# Patient Record
Sex: FEMALE | Race: WHITE | NOT HISPANIC OR LATINO | Employment: PART TIME | URBAN - METROPOLITAN AREA
[De-identification: names, ages, dates, MRNs, and addresses within clinical notes are randomized per-mention and may not be internally consistent; named-entity substitution may affect disease eponyms.]

---

## 2019-10-15 LAB
BUPRENORPHINE GLUCURONIDE URINE: <5 NG/ML
BUPRENORPHINE URINE: <2 NG/ML
N-DESMETHYLTAPENTADOL, URINE: <25 NG/ML
NALOXONE URINE: <100 NG/ML
NORBUPRENORPHINE GLUCURONIDE URINE: <5 NG/ML
NORBUPRENORPHINE, URINE: <2 NG/ML
O-DESMETHYLTRAMADOL,UR: <25 NG/ML
TAPENTADOL, URINE: <25 NG/ML
TRAMADOL, UR GC/MS CONF: <25 NG/ML

## 2023-02-20 LAB
CREATININE (MG/DL) IN SER/PLAS: 0.7 MG/DL (ref 0.5–1.05)
GFR FEMALE: >90 ML/MIN/1.73M2
UREA NITROGEN (MG/DL) IN SER/PLAS: 20 MG/DL (ref 6–23)

## 2023-03-16 LAB — ALCOHOL (MG/DL) IN URINE: <10 MG/DL

## 2023-03-20 LAB
6-ACETYLMORPHINE: <25 NG/ML
7-AMINOCLONAZEPAM: <25 NG/ML
ALPHA-HYDROXYALPRAZOLAM: <25 NG/ML
ALPHA-HYDROXYMIDAZOLAM: <25 NG/ML
ALPRAZOLAM: <25 NG/ML
AMPHETAMINE (PRESENCE) IN URINE BY SCREEN METHOD: ABNORMAL
BARBITURATES PRESENCE IN URINE BY SCREEN METHOD: ABNORMAL
BUPRENORPHINE ,URINE: <2 NG/ML
BUPRENORPHINE GLUC, URINE: <5 NG/ML
CANNABINOIDS IN URINE BY SCREEN METHOD: ABNORMAL
CHLORDIAZEPOXIDE: <25 NG/ML
CLONAZEPAM: <25 NG/ML
COCAINE (PRESENCE) IN URINE BY SCREEN METHOD: ABNORMAL
CODEINE: <50 NG/ML
CREATINE, URINE FOR DRUG: 247.7 MG/DL
DIAZEPAM: <25 NG/ML
DRUG SCREEN COMMENT URINE: ABNORMAL
EDDP: <25 NG/ML
ETHYL GLUCURONIDE SCREEN: NEGATIVE NG/ML
FENTANYL CONFIRMATION, URINE: <2.5 NG/ML
HYDROCODONE: 1047 NG/ML
HYDROMORPHONE: 479 NG/ML
LORAZEPAM: <25 NG/ML
METHADONE CONFIRMATION,URINE: <25 NG/ML
MIDAZOLAM: <25 NG/ML
MORPHINE URINE: <50 NG/ML
N-DESMETHYLTAPENTADOL: <25 NG/ML
NALOXONE, URINE: <100 NG/ML
NORBUPRENORPHINE GLUC,URINE: <5 NG/ML
NORBUPRENORPHINE, URINE: <2 NG/ML
NORDIAZEPAM: 197 NG/ML
NORFENTANYL: <2.5 NG/ML
NORHYDROCODONE: >1000 NG/ML
NOROXYCODONE: <25 NG/ML
O-DESMETHYLTRAMADOL: <50 NG/ML
OXAZEPAM: 600 NG/ML
OXYCODONE: <25 NG/ML
OXYMORPHONE: <25 NG/ML
PHENCYCLIDINE (PRESENCE) IN URINE BY SCREEN METHOD: ABNORMAL
TAPENTADOL: <25 NG/ML
TEMAZEPAM: 495 NG/ML
TRAMADOL: <50 NG/ML
ZOLPIDEM METABOLITE (ZCA): <25 NG/ML
ZOLPIDEM: <25 NG/ML

## 2023-09-14 LAB — ALCOHOL (MG/DL) IN URINE: 11 MG/DL

## 2023-09-18 LAB
6-ACETYLMORPHINE: <25 NG/ML
7-AMINOCLONAZEPAM: <25 NG/ML
ALPHA-HYDROXYALPRAZOLAM: <25 NG/ML
ALPHA-HYDROXYMIDAZOLAM: <25 NG/ML
ALPRAZOLAM: <25 NG/ML
AMPHETAMINE (PRESENCE) IN URINE BY SCREEN METHOD: ABNORMAL
BARBITURATES PRESENCE IN URINE BY SCREEN METHOD: ABNORMAL
BUPRENORPHINE ,URINE: <2 NG/ML
BUPRENORPHINE GLUC, URINE: <5 NG/ML
CANNABINOIDS IN URINE BY SCREEN METHOD: ABNORMAL
CHLORDIAZEPOXIDE: <25 NG/ML
CLONAZEPAM: <25 NG/ML
COCAINE (PRESENCE) IN URINE BY SCREEN METHOD: ABNORMAL
CODEINE: <50 NG/ML
CREATINE, URINE FOR DRUG: 155.8 MG/DL
DIAZEPAM: <25 NG/ML
DRUG SCREEN COMMENT URINE: ABNORMAL
EDDP: <25 NG/ML
FENTANYL CONFIRMATION, URINE: <2.5 NG/ML
HYDROCODONE: 511 NG/ML
HYDROMORPHONE: 231 NG/ML
LORAZEPAM: <25 NG/ML
METHADONE CONFIRMATION,URINE: <25 NG/ML
MIDAZOLAM: <25 NG/ML
MORPHINE URINE: <50 NG/ML
N-DESMETHYLTAPENTADOL: <25 NG/ML
NALOXONE, URINE: <100 NG/ML
NORBUPRENORPHINE GLUC,URINE: <5 NG/ML
NORBUPRENORPHINE, URINE: <2 NG/ML
NORDIAZEPAM: 32 NG/ML
NORFENTANYL: <2.5 NG/ML
NORHYDROCODONE: >1000 NG/ML
NOROXYCODONE: <25 NG/ML
O-DESMETHYLTRAMADOL: <50 NG/ML
OXAZEPAM: 106 NG/ML
OXYCODONE: <25 NG/ML
OXYMORPHONE: <25 NG/ML
PHENCYCLIDINE (PRESENCE) IN URINE BY SCREEN METHOD: ABNORMAL
TAPENTADOL: <25 NG/ML
TEMAZEPAM: 86 NG/ML
TRAMADOL: <50 NG/ML
ZOLPIDEM METABOLITE (ZCA): <25 NG/ML
ZOLPIDEM: <25 NG/ML

## 2023-10-04 NOTE — H&P (VIEW-ONLY)
Diagnoses/Problems     · Long term prescription opiate use (V58.69) (Z79.891)   · Cervical radiculopathy, chronic (723.4) (M54.12)   · Chronic lumbar radiculopathy (724.4) (M54.16)    Orders     · DULoxetine HCl - 60 MG Oral Capsule Delayed Release Particles; TAKE 1  CAPSULE ONCE DAILY     · DULoxetine HCl - 30 MG Oral Capsule Delayed Release Particles; TAKE 1  CAPSULE Daily     · Alcohol, Urine; Status:Active - Retrospective Authorization; Requested for:[id number];    · Buprenorphine Confirm, U; Status:Active - Retrospective Authorization; Requested  for:[id number];    · OPIATE/OPIOID/BENZO [EXTENDED] PRESCRIPTION COMPLIANCE; Status:Active -  Retrospective Authorization; Requested for:[id number];    · TAPENTADOL CONFIRM.,URINE; Status:Active - Retrospective Authorization;  Requested for:[id number];      · From  HYDROcodone-Acetaminophen 5-325 MG Oral Tablet TAKE 1 TABLET  3 times daily PRN pain To HYDROcodone-Acetaminophen 5-325 MG Oral Tablet (Norco)  Take 1 tablet twice daily    Provider Impressions    Assessment:  Malu Degroot is a 67-year-old female who presents as a new patient in our clinic today. Patient has a history of chronic lumbar radiculopathy status post multiple epidural steroid injections with significant relief. She presents with bilateral neck pain and upper extremity radiculopathy. We discussed with the patient that the nature of her disease is most likely chronic wear-and-tear of the cervical spine resulting in narrowing of the spinal nerve roots bilaterally. I did review patient's MRI of reports showing some degeneration especially at C4-5 with foraminal stenosis likely causing the upper extremity neuritis. We discussed that the mainstay of treatment for this is a combination of medication optimization, physical therapy, as well as injections. We also discussed interaction concerning benzodiazepines and narcotics. Patient was educated on the importance of weaning off of 1 of these medications  "while being on the other. She stated she would like to continue taking her benzodiazepine, and was amenable to weaning down and off of her Norco. We discussed introducing Cymbalta in place of the Norco, including the side effects of medication, and she wishes to proceed with this change. We also discussed a cervical epidural steroid injection under fluoroscopy including the risks and benefits of the procedure, and she wishes to proceed.    Plan:  1. Patient will wean down Norco 5 mg to 2 times a day for the next month, and then 1 time per day in a subsequent month, and then eventually off. [place] reviewed  2. Patient will be started on Cymbalta 30-60 mg nightly.  3. Patient will continue physical therapy exercises at home as prescribed. She was educated on the importance of maintaining these exercises even if her symptoms improve.  4. Patient will be scheduled for a cervical epidural steroid injection under fluoroscopy. Of note, the patient does have diabetes, and we indicated that we will use half a dose of steroids for the injection. Discussed risk benefits and alternatives and patient would like to proceed. She has failed conservative treatment to include physical therapy finished last month as well as multiple medications to include NSAIDs    CPT 56743  â€“ Continue gabapentin 600 at night.  â€“ Patient will follow-up with our nurse practitioner in 1 month after the injection.      Chief Complaint     Location of Pain: Patient returns to the office for interval re-evaluation of \" lower back pain that radiates into bilateral hips/buttocks down the both legs to the toes. Pain in right shin and right thigh. I have burning/numbness in bilateral feet. The pain also radiates up into the shoulder blades. Neck pain as well. \" The pain is \"constant\" but varies in severity and continues to be worsened with activities of daily living while relieved by prescription medication.   No new c/o pain today, wants to discuss neck " "injections.    August MRI neck and shoulder    Pain Score: \"6\" /10 0-10 score    Treatment: [place] 5/325 mg TID- [facility]  Medication Count: 23 pills left in rx bottle   Fill Date: 8/18/23  Last Doses Taken: 9/12/23 3 doses and takes BID-TID  Efficacy: \" 20% relief for 3-4 hours \"  Side Effects: denies    Narcan: Pt brought unopened Narcan to today's visit. EXP: 5/2024    Other treatment plans-medications/neuromodulators: Gabapentin, taking 600 daily currently-needs refill?- [address] [address] [facility]/ Ibuprofen 800mg -needs refill 1 tab TID/ compound cream [facility]- uses daily    Injections and/or Procedures:4/25/22 bilateral bursa and 4/18/22 LESI    Plan of Care: Pt sts, \"I'm satisfied with my current plan of care.\"   Pregnant: n/a    Other: denies    SWAB genetic test:     Urine Screen: 9/13/23  Office agreement: 6/12/23  Narcotics Agreement: 6/12/23  ORT: 9/13/23 score 4  PDUQ: 6/12/23 score 4      History of Present Illness    Controlled Substance:   OPIOID   Opioid Risk Screening:   Opioid Risk Tool   Last opioid risk screening date/ordered today: 9/13/23  Family history of substance abuse: Illegal Drugs = 3  Patient's total score is 3, within range of Low Risk (<= 3).       Malu Degroot is a pleasant 67-year-old female new patient in our clinic today. Patient has a history of lumbar spinal stenosis with bilateral lower extremity competency as well as cervical radiculopathy in the upper extremities. Injections in her lumbar spine that has given her significant relief in the past. Today her biggest complaint is pain in the neck associated with pain in the shoulders and arms on both sides. Pain is associated with numbness and tingling mostly on the right side in the fingers. She does endorse dropping items and weakness on the right side more than left. She denies any temperature changes, nausea or vomiting, or vision changes. Patient currently takes gabapentin 600 mg at nighttime, as well as Norco 5 milligrams " 2â€“3 times a day, diazepam 5 mg daily. She recently finished a course of 10 sessions of physical therapy in August and continues to do exercises at home.     I have personally reviewed the OARRS report for [facility]. I have considered the risks of abuse, dependence, addiction and diversion.   Is the patient prescribed a combination of a benzodiazepine and opioid? Yes.   Weaning off Norco   Last urine drug screening date/ordered today: 9/13/2023   Results of last screen: Results as expected.   Controlled Substance Agreement:   I have printed this form and reviewed each line item with the patient and the patient has verbalized understanding.   Date of the last Controlled Substance Agreement: 6/12/2023      Review of Systems    13 systems all normal except noted in HP.      Active Problems     · Abnormal posture (781.92) (R29.3)   · Acute midline thoracic back pain (724.1) (M54.6)   · Acute nonintractable headache, unspecified headache type (784.0) (R51.9)   · Asymptomatic menopause (V49.81) (Z78.0)   · Bilateral hip pain (719.45) (M25.551,M25.552)   · Body mass index (BMI) of 34.0 to 34.9 in adult (V85.34) (Z68.34)   · Bulge of lumbar disc without myelopathy (722.52) (M51.36)   · Cervical radiculopathy, chronic (723.4) (M54.12)   · Chronic diarrhea (787.91) (K52.9)   · Chronic lumbar radiculopathy (724.4) (M54.16)   · Chronic neck and back pain (723.1,724.5,338.29) (M54.2,M54.9,G89.29)   · Class 2 severe obesity with serious comorbidity and body mass index (BMI) of 35.0 to  35.9 in adult (278.01,V85.35) (E66.01,Z68.35)   · Colon polyps (211.3) (K63.5)   · Daily nausea (787.02) (R11.0)   · Depression, major, in remission (296.25) (F32.5)   · Disc displacement, lumbar (722.10) (M51.26)   · Dysphagia (787.20) (R13.10)   · Encounter for immunization (V03.89) (Z23)   · Episodic benzodiazepine dependence (304.12) (F13.20)   · Frequent UTI (599.0) (N39.0)   · GERD (gastroesophageal reflux disease) (530.81) (K21.9)   ·  Greater trochanteric bursitis of both hips (726.5) (M70.61,M70.62)   · History of Meniere's disease (V12.49) (Z86.69)   · Inguinal pain (789.09) (R10.30)   · Irritable bowel syndrome, unspecified type (564.1) (K58.9)   · Kidney stones, calcium oxalate (592.0) (N20.0)   · Lipid screening (V77.91) (Z13.220)   · Lipoma of torso (214.1) (D17.1)   · Localized primary osteoarthritis of carpometacarpal joint of right thumb (715.14)  (M18.11)   · Long term prescription opiate use (V58.69) (Z79.891)   · Lung nodule (793.11) (R91.1)   · Migraines (346.90) (G43.909)   · Myofascial pain syndrome (729.1) (M79.18)   · Neck pain (723.1) (M54.2)   · Neuropathic pain (729.2) (M79.2)   · Obstructive sleep apnea syndrome in adult (327.23) (G47.33)   · Overactive bladder (596.51) (N32.81)   · Pain management (780.96) (R52)   · Pain of both sacroiliac joints (724.6) (M53.3)   · Preventative health care (V70.0) (Z00.00)   · Primary osteoarthritis of right knee (715.16) (M17.11)   · Radiculopathy (729.2) (M54.10)   · Right foot pain (729.5) (M79.671)   · Right knee pain (719.46) (M25.561)   · Right wrist pain (719.43) (M25.531)   · Sacroiliitis (720.2) (M46.1)   · Screen for colon cancer (V76.51) (Z12.11)   · Screening mammogram, encounter for (V76.12) (Z12.31)   · Sebaceous cyst (706.2) (L72.3)   · Shortness of breath on exertion (786.05) (R06.02)   · Skin cancer screening (V76.43) (Z12.83)   · Type 2 diabetes mellitus with hyperglycemia, without long-term current use of insulin  (250.00,790.29) (E11.65)   · Vertigo (780.4) (R42)    Past Medical History     · History of Adrenal mass (255.8) (E27.8)   · History of Bilateral lumbar radiculopathy (724.4) (M54.16)   · Resolved Date: 16 Nov 2020   · History of BiPAP (biphasic positive airway pressure) dependence (V46.8) (Z99.89)   · History of Calcium kidney stone (592.0) (N20.0)   · Resolved Date: 16 Nov 2020   · History of CPAP (continuous positive airway pressure) dependence (V46.8)  (Z99.89)   · History of Generalized abdominal pain (789.07) (R10.84)   · H/O renal calculi (V13.01) (Z87.442)   · History of arthritis (V13.4) (Z87.39)   · History of chronic back pain (V13.59) (Z87.39)   · History of degenerative disc disease (V13.59) (Z87.39)   · History of depression (V11.8) (Z86.59)   · History of dizziness (V13.89) (Z87.898)   · Resolved Date: 20 Feb 2020   · History of ear pain (V12.49) (Z86.69)   · History of endometriosis (V13.29) (Z87.42)   · History of esophageal reflux (V12.79) (Z87.19)   · History of fibromyalgia (V13.59) (Z87.39)   · History of hematuria (V13.09) (Z87.448)   · History of lateral epicondylitis of right elbow (V13.59) (Z87.39)   · Resolved Date: 16 Nov 2020   · History of malignant neoplasm of skin (V10.83) (Z85.828)   · History of Meniere's disease (V12.49) (Z86.69)   · History of migraine (V12.49) (Z86.69)   · History of sleep apnea (V13.89) (Z86.69)   · History of Infection of right foot (686.9) (L08.9)   · Resolved Date: 16 Nov 2020   · Irritable bowel syndrome, unspecified type (564.1) (K58.9)   · History of Lumbar pain (724.2) (M54.50)   · Resolved Date: 20 May 2021   · History of Pain of right thumb (729.5) (M79.644)   · Resolved Date: 12 Nov 2019   · History of SOB (shortness of breath) on exertion (786.05) (R06.02)   · History of Uses inhaler device    Surgical History     · History of Breast biopsy   · RIGHT AND LEFT   · History of Carpal tunnel surgery   · History of Cholecystectomy   · History of Cyst excision   · History of Excision of basal cell carcinoma   · History of Foot surgery   · History of Hysterectomy   · History of Oophorectomy   · LEFT AND RIGHT   · History of Skin biopsy   · History of Tonsillectomy   · History of Wrist surgery    Family History     · Family history of Drug or chemical induced diabetes mellitus with diabetic autonomic  neuropathy, without long-term current use of insulin   · Family history of cerebrovascular accident (CVA)  (V17.1) (Z82.3)   · Family history of coronary artery disease (V17.3) (Z82.49)   · Family history of diabetes mellitus (V18.0) (Z83.3)   · Family history of malignant neoplasm (V16.9) (Z80.9)   · BREAST AND SKIN CANCER   · Family history of malignant neoplasm of breast (V16.3) (Z80.3)     · Family history of Anxiety   · Family history of depression (V17.0) (Z81.8)   · Family history of malignant neoplasm (V16.9) (Z80.9)   · BREAST AND SKIN CANCER   · Family history of malignant neoplasm of skin (V16.8) (Z80.8)     · Family history of malignant neoplasm (V16.9) (Z80.9)   · BREAST AND SKIN CANCER   · Family history of malignant neoplasm of skin (V16.8) (Z80.8)     · Family history of malignant neoplasm (V16.9) (Z80.9)   · BREAST AND SKIN CANCER   · Family history of malignant neoplasm of esophagus (V16.0) (Z80.0)   · Family history of malignant neoplasm of skin (V16.8) (Z80.8)    Social History     · Caffeine use (V49.89) (Z78.9)   · Does not use illicit drugs (V49.89) (Z78.9)   · Drug use (305.90) (F19.90)   · PAST   · Employed   · Former smoker (V15.82) (Z87.891)   · Lives with spouse   ·    · No alcohol use    Allergies     · buprenorphine   Rash; Recorded By: Breonna Hoyt; 9/1/2020 3:36:11 PM   · Sulfa Drugs   Recorded By: Gio Schmidt; 2/5/2019 9:47:49 AM    Current Meds    Medication Name Instruction   Accu-Chek Katie Plus In Vitro Strip TEST ONCE DAILY. PLEASE DISPENSE INSURANCE PREFERRED BRAND   Accu-Chek Katie Plus w/Device Kit USE AS DIRECTED please dispense insurance preferred brand   Accu-Chek Multiclix Lancets MISC USE TO TEST GLUCOSE ONCE DAILY PLEAS DISPENSE INSURANCE PREFERRED BRAND   Albuterol Sulfate  (90 Base) MCG/ACT Inhalation Aerosol Solution INHALE 1 TO 2 PUFFS EVERY 4 TO 6 HOURS AS NEEDED.   Bentyl 20 MG TABS    Black Elderberry(Berry-Flower) 575 MG Oral Capsule    diazePAM 5 MG Oral Tablet TAKE 1 TABLET DAILY AS NEEDED.   Flexeril 10 MG TABS    Gabapentin 300 MG Oral Capsule  TAKE 3 CAPSULE Bedtime   HYDROcodone-Acetaminophen 5-325 MG Oral Tablet TAKE 1 TABLET 3 times daily PRN pain   Ibuprofen 800 MG Oral Tablet TAKE 1 TABLET 3 times daily PRN for pain take with food   Multi-Vitamin TABS    Narcan 4 MG/0.1ML Nasal Liquid Spray 4 mg intranasally once if needed for overdose or respiratory depression   Ondansetron HCl - 4 MG Oral Tablet TAKE 1 TABLET Twice daily PRN   Ozempic (0.25 or 0.5 MG/DOSE) 2 MG/3ML Subcutaneous Solution Pen-injector    Scopolamine 1 MG/3DAYS Transdermal Patch 72 Hour APPLY 1 PATCH EVERY 3 DAYS   Transdermal Pain Base External Cream APPLY AS DIRECTED.     Vitals  Vital Signs    Recorded: [id number] 11:08AM   Heart Rate 80   Respiration 18   Systolic 130   Diastolic 80   Height 5 ft 6 in   Weight 209 lb 3.2 oz   BMI Calculated 33.77 kg/m2   BSA Calculated 2.04   O2 Saturation 96     Physical Exam  PHYSICAL EXAM  Vitals signs reviewed  Constitutional:    General: Not in acute distress   Appearance: Normal appearance. Not ill-appearing.  HENT:   Head: Normocephalic and atraumatic  Eyes:   Conjunctiva/sclera: Conjunctivae normal  Cardiovascular:   Rate and Rhythm: Normal rate and regular rhythm  Pulmonary:   Effort: No respiratory distress  Abdominal:   Palpations: Abdomen is soft  Musculoskeletal: RODRIGUEZ Spurling test positive bilaterally. 4/5 strength in the right upper extremity, 5/5 strength in left upper extremity.  Skin:   General: Skin is warm and dry  Neurological:   General: Sensation to pinprick and light touch over the C6-7 distributions right hand. No focal deficits in the left upper extremity.  Psychiatric:    Mood and Affect: Mood normal    Behavior: Behavior normal       Results/Data  Radiology were obtained and reviewed. Pertinent positive and negative findings were considered in medical decision making. Patient underwent an MRI of cervical spine from outside at work and presented with paper copy of the results. Documentation noted only mild neural foraminal  narrowing without central canal stenosis in the cervical spine.      'Scores and Scales'  Pain Disability Index    [id number] 11:14AM   Family and Home Responsibilities: Activities related to home and family 8   Recreation: Hobbies sports and other leisure time activities 8   Social Activity: Participation with friends and acquaintances other than family members 8   Occupation: Activities partly or directly related to working including housework or volunteering 9   Sexual Behavior: Frequency and quality of sex life 10   Self Care: Personal maintenance and independent daily living (bathing dressing etc.) 8   Life-support Activity: Basic life-supporting behaviors (eating sleeping breathing etc.) 8   PDI Total 59     Attending Note       Trainee role: Fellow           I saw and evaluated the patient. I personally obtained the key and critical portions of the history and physical exam or was physically present for key and critical portions performed by the trainee. I reviewed the trainee's documentation and discussed the patient with the trainee. I agree with the trainee's medical decision making, as documented on the trainee's note.      Signatures   Electronically signed by : Tommy Gonzalez, ; Sep 13 2023 11:57AM EST (Author)    Electronically signed by : Ilan Harvey MD; Sep 13 2023 12:12PM EST (Author)

## 2023-10-10 DIAGNOSIS — M54.12 CERVICAL NEURITIS: Primary | ICD-10-CM

## 2023-10-13 ENCOUNTER — HOSPITAL ENCOUNTER (OUTPATIENT)
Dept: RADIOLOGY | Facility: HOSPITAL | Age: 68
Discharge: HOME | End: 2023-10-13
Payer: MEDICARE

## 2023-10-13 ENCOUNTER — HOSPITAL ENCOUNTER (OUTPATIENT)
Dept: PAIN MEDICINE | Facility: HOSPITAL | Age: 68
Discharge: HOME | End: 2023-10-13
Payer: MEDICARE

## 2023-10-13 VITALS
DIASTOLIC BLOOD PRESSURE: 80 MMHG | RESPIRATION RATE: 18 BRPM | SYSTOLIC BLOOD PRESSURE: 134 MMHG | TEMPERATURE: 97 F | OXYGEN SATURATION: 95 % | HEART RATE: 83 BPM

## 2023-10-13 DIAGNOSIS — M54.12 CERVICAL RADICULOPATHY, CHRONIC: Primary | ICD-10-CM

## 2023-10-13 DIAGNOSIS — R52 PAIN: ICD-10-CM

## 2023-10-13 DIAGNOSIS — M54.12 CERVICAL NEURITIS: ICD-10-CM

## 2023-10-13 PROBLEM — D48.5 NEOPLASM OF UNCERTAIN BEHAVIOR OF SKIN: Status: ACTIVE | Noted: 2021-08-31

## 2023-10-13 PROBLEM — M70.62 GREATER TROCHANTERIC BURSITIS OF BOTH HIPS: Status: ACTIVE | Noted: 2023-10-13

## 2023-10-13 PROBLEM — R06.02 SHORTNESS OF BREATH ON EXERTION: Status: ACTIVE | Noted: 2023-10-13

## 2023-10-13 PROBLEM — F13.20: Status: ACTIVE | Noted: 2023-10-13

## 2023-10-13 PROBLEM — Z86.69 HISTORY OF MENIERE'S DISEASE: Status: ACTIVE | Noted: 2023-10-13

## 2023-10-13 PROBLEM — M46.1 SACROILIITIS (CMS-HCC): Status: ACTIVE | Noted: 2023-10-13

## 2023-10-13 PROBLEM — Z79.891 LONG TERM PRESCRIPTION OPIATE USE: Status: ACTIVE | Noted: 2023-10-13

## 2023-10-13 PROBLEM — M51.26 DISC DISPLACEMENT, LUMBAR: Status: ACTIVE | Noted: 2023-10-13

## 2023-10-13 PROBLEM — L57.0 ACTINIC KERATOSIS: Status: ACTIVE | Noted: 2021-08-31

## 2023-10-13 PROBLEM — L72.3 SEBACEOUS CYST: Status: ACTIVE | Noted: 2023-10-13

## 2023-10-13 PROBLEM — E66.01 CLASS 2 SEVERE OBESITY WITH SERIOUS COMORBIDITY AND BODY MASS INDEX (BMI) OF 35.0 TO 35.9 IN ADULT (MULTI): Status: ACTIVE | Noted: 2023-10-13

## 2023-10-13 PROBLEM — M54.9 CHRONIC NECK AND BACK PAIN: Status: ACTIVE | Noted: 2023-10-13

## 2023-10-13 PROBLEM — R91.1 LUNG NODULE: Status: ACTIVE | Noted: 2023-10-13

## 2023-10-13 PROBLEM — M17.11 PRIMARY OSTEOARTHRITIS OF RIGHT KNEE: Status: ACTIVE | Noted: 2023-10-13

## 2023-10-13 PROBLEM — E11.65 TYPE 2 DIABETES MELLITUS WITH HYPERGLYCEMIA, WITHOUT LONG-TERM CURRENT USE OF INSULIN (MULTI): Status: ACTIVE | Noted: 2023-10-13

## 2023-10-13 PROBLEM — D49.2 NEOPLASM OF UNSPECIFIED BEHAVIOR OF BONE, SOFT TISSUE, AND SKIN: Status: ACTIVE | Noted: 2021-08-31

## 2023-10-13 PROBLEM — M54.2 CHRONIC NECK AND BACK PAIN: Status: ACTIVE | Noted: 2023-10-13

## 2023-10-13 PROBLEM — G89.29 CHRONIC NECK AND BACK PAIN: Status: ACTIVE | Noted: 2023-10-13

## 2023-10-13 PROBLEM — R42 VERTIGO: Status: ACTIVE | Noted: 2023-10-13

## 2023-10-13 PROBLEM — M54.16 CHRONIC LUMBAR RADICULOPATHY: Status: ACTIVE | Noted: 2023-10-13

## 2023-10-13 PROBLEM — K63.5 COLON POLYPS: Status: ACTIVE | Noted: 2023-10-13

## 2023-10-13 PROBLEM — L82.1 OTHER SEBORRHEIC KERATOSIS: Status: ACTIVE | Noted: 2021-08-31

## 2023-10-13 PROBLEM — L81.4 OTHER MELANIN HYPERPIGMENTATION: Status: ACTIVE | Noted: 2021-08-31

## 2023-10-13 PROBLEM — R11.0 DAILY NAUSEA: Status: ACTIVE | Noted: 2023-10-13

## 2023-10-13 PROBLEM — R29.3 ABNORMAL POSTURE: Status: ACTIVE | Noted: 2023-10-13

## 2023-10-13 PROBLEM — M70.61 GREATER TROCHANTERIC BURSITIS OF BOTH HIPS: Status: ACTIVE | Noted: 2023-10-13

## 2023-10-13 PROBLEM — D22.4 MELANOCYTIC NEVI OF SCALP AND NECK: Status: ACTIVE | Noted: 2021-08-31

## 2023-10-13 PROBLEM — M51.36 BULGE OF LUMBAR DISC WITHOUT MYELOPATHY: Status: ACTIVE | Noted: 2023-10-13

## 2023-10-13 PROBLEM — D17.1 LIPOMA OF TORSO: Status: ACTIVE | Noted: 2023-10-13

## 2023-10-13 PROBLEM — D22.5 MELANOCYTIC NEVI OF TRUNK: Status: ACTIVE | Noted: 2021-08-31

## 2023-10-13 PROBLEM — D22.70 MELANOCYTIC NEVI OF UNSPECIFIED LOWER LIMB, INCLUDING HIP: Status: ACTIVE | Noted: 2021-08-31

## 2023-10-13 PROBLEM — M51.369 BULGE OF LUMBAR DISC WITHOUT MYELOPATHY: Status: ACTIVE | Noted: 2023-10-13

## 2023-10-13 PROBLEM — E66.812 CLASS 2 SEVERE OBESITY WITH SERIOUS COMORBIDITY AND BODY MASS INDEX (BMI) OF 35.0 TO 35.9 IN ADULT: Status: ACTIVE | Noted: 2023-10-13

## 2023-10-13 PROBLEM — Z85.828 PERSONAL HISTORY OF OTHER MALIGNANT NEOPLASM OF SKIN: Status: ACTIVE | Noted: 2021-08-31

## 2023-10-13 PROBLEM — L57.9 SKIN CHANGES DUE TO CHRONIC EXPOSURE TO NONIONIZING RADIATION, UNSPECIFIED: Status: ACTIVE | Noted: 2021-08-31

## 2023-10-13 PROBLEM — K58.9 IRRITABLE BOWEL SYNDROME: Status: ACTIVE | Noted: 2023-10-13

## 2023-10-13 PROBLEM — D22.60 MELANOCYTIC NEVI OF UNSPECIFIED UPPER LIMB, INCLUDING SHOULDER: Status: ACTIVE | Noted: 2021-08-31

## 2023-10-13 PROBLEM — R13.10 DYSPHAGIA: Status: ACTIVE | Noted: 2023-10-13

## 2023-10-13 PROBLEM — F32.5 DEPRESSION, MAJOR, IN REMISSION (CMS-HCC): Status: ACTIVE | Noted: 2023-10-13

## 2023-10-13 PROBLEM — N32.81 OVERACTIVE BLADDER: Status: ACTIVE | Noted: 2023-10-13

## 2023-10-13 PROBLEM — K52.9 CHRONIC DIARRHEA: Status: ACTIVE | Noted: 2023-10-13

## 2023-10-13 PROBLEM — M18.11 LOCALIZED PRIMARY OSTEOARTHRITIS OF CARPOMETACARPAL JOINT OF RIGHT THUMB: Status: ACTIVE | Noted: 2023-10-13

## 2023-10-13 PROBLEM — D22.39 MELANOCYTIC NEVI OF OTHER PARTS OF FACE: Status: ACTIVE | Noted: 2021-08-31

## 2023-10-13 PROBLEM — N20.0 KIDNEY STONES, CALCIUM OXALATE: Status: ACTIVE | Noted: 2023-10-13

## 2023-10-13 PROCEDURE — 62321 NJX INTERLAMINAR CRV/THRC: CPT | Performed by: PHYSICAL MEDICINE & REHABILITATION

## 2023-10-13 PROCEDURE — 2550000001 HC RX 255 CONTRASTS: Performed by: PHYSICAL MEDICINE & REHABILITATION

## 2023-10-13 PROCEDURE — 77003 FLUOROGUIDE FOR SPINE INJECT: CPT

## 2023-10-13 PROCEDURE — 2500000005 HC RX 250 GENERAL PHARMACY W/O HCPCS: Performed by: PHYSICAL MEDICINE & REHABILITATION

## 2023-10-13 PROCEDURE — 2500000004 HC RX 250 GENERAL PHARMACY W/ HCPCS (ALT 636 FOR OP/ED): Performed by: PHYSICAL MEDICINE & REHABILITATION

## 2023-10-13 PROCEDURE — 7100000009 HC PHASE TWO TIME - INITIAL BASE CHARGE

## 2023-10-13 PROCEDURE — 7100000010 HC PHASE TWO TIME - EACH INCREMENTAL 1 MINUTE

## 2023-10-13 RX ORDER — CEFUROXIME AXETIL 250 MG/1
6 TABLET ORAL AS NEEDED
COMMUNITY
Start: 2019-11-21 | End: 2023-10-13 | Stop reason: ALTCHOICE

## 2023-10-13 RX ORDER — BLOOD SUGAR DIAGNOSTIC
1 STRIP MISCELLANEOUS DAILY
COMMUNITY
Start: 2022-03-02

## 2023-10-13 RX ORDER — HYDROGEN PEROXIDE 3 %
20 SOLUTION, NON-ORAL MISCELLANEOUS DAILY
COMMUNITY
End: 2023-10-13 | Stop reason: SDUPTHER

## 2023-10-13 RX ORDER — SEMAGLUTIDE 0.68 MG/ML
INJECTION, SOLUTION SUBCUTANEOUS
COMMUNITY

## 2023-10-13 RX ORDER — MULTIVITAMIN
TABLET ORAL
COMMUNITY

## 2023-10-13 RX ORDER — UBIDECARENONE 75 MG
500 CAPSULE ORAL DAILY
COMMUNITY

## 2023-10-13 RX ORDER — CREAM BASE NO.31
1 CREAM (GRAM) MISCELLANEOUS SEE ADMIN INSTRUCTIONS
COMMUNITY

## 2023-10-13 RX ORDER — FLUTICASONE PROPIONATE 50 MCG
2 SPRAY, SUSPENSION (ML) NASAL DAILY
COMMUNITY
Start: 2023-04-28

## 2023-10-13 RX ORDER — DEXAMETHASONE SODIUM PHOSPHATE 4 MG/ML
INJECTION, SOLUTION INTRA-ARTICULAR; INTRALESIONAL; INTRAMUSCULAR; INTRAVENOUS; SOFT TISSUE AS NEEDED
Status: COMPLETED | OUTPATIENT
Start: 2023-10-13 | End: 2023-10-13

## 2023-10-13 RX ORDER — DICYCLOMINE HYDROCHLORIDE 20 MG/1
20 TABLET ORAL
COMMUNITY

## 2023-10-13 RX ORDER — MELOXICAM 7.5 MG/1
1 TABLET ORAL DAILY
COMMUNITY
Start: 2015-10-14 | End: 2023-10-13 | Stop reason: ALTCHOICE

## 2023-10-13 RX ORDER — PANTOPRAZOLE SODIUM 40 MG/1
40 TABLET, DELAYED RELEASE ORAL
COMMUNITY

## 2023-10-13 RX ORDER — SUMATRIPTAN SUCCINATE 100 MG/1
100 TABLET ORAL ONCE AS NEEDED
COMMUNITY
Start: 2015-10-16 | End: 2023-10-13 | Stop reason: ALTCHOICE

## 2023-10-13 RX ORDER — LIDOCAINE HYDROCHLORIDE 5 MG/ML
INJECTION, SOLUTION INFILTRATION; INTRAVENOUS AS NEEDED
Status: COMPLETED | OUTPATIENT
Start: 2023-10-13 | End: 2023-10-13

## 2023-10-13 RX ORDER — SUMATRIPTAN 6 MG/.5ML
1 INJECTION, SOLUTION SUBCUTANEOUS DAILY PRN
COMMUNITY

## 2023-10-13 RX ORDER — LANCETS 33 GAUGE
EACH MISCELLANEOUS
COMMUNITY
Start: 2023-07-28

## 2023-10-13 RX ORDER — ALBUTEROL SULFATE 90 UG/1
1-2 AEROSOL, METERED RESPIRATORY (INHALATION) EVERY 6 HOURS PRN
COMMUNITY
Start: 2019-11-21

## 2023-10-13 RX ORDER — PROPRANOLOL HYDROCHLORIDE 60 MG/1
60 CAPSULE, EXTENDED RELEASE ORAL DAILY
COMMUNITY
Start: 2022-07-20

## 2023-10-13 RX ORDER — GABAPENTIN 300 MG/1
900 CAPSULE ORAL NIGHTLY
COMMUNITY
Start: 2023-07-03 | End: 2023-11-09 | Stop reason: SDUPTHER

## 2023-10-13 RX ORDER — SCOLOPAMINE TRANSDERMAL SYSTEM 1 MG/1
1 PATCH, EXTENDED RELEASE TRANSDERMAL
COMMUNITY
Start: 2019-11-21

## 2023-10-13 RX ORDER — DIAZEPAM 10 MG/1
10 TABLET ORAL EVERY 6 HOURS PRN
COMMUNITY
End: 2023-10-13 | Stop reason: SDUPTHER

## 2023-10-13 RX ORDER — NALOXONE HYDROCHLORIDE 4 MG/.1ML
4 SPRAY NASAL AS NEEDED
COMMUNITY
Start: 2020-05-26

## 2023-10-13 RX ORDER — IBUPROFEN 800 MG/1
800 TABLET ORAL
COMMUNITY
Start: 2023-08-08 | End: 2023-11-09 | Stop reason: SDUPTHER

## 2023-10-13 RX ORDER — ONDANSETRON 4 MG/1
4 TABLET, FILM COATED ORAL 2 TIMES DAILY PRN
COMMUNITY
Start: 2019-11-21

## 2023-10-13 RX ORDER — DIAZEPAM 5 MG/1
1 TABLET ORAL DAILY PRN
COMMUNITY

## 2023-10-13 RX ORDER — ASPIRIN 325 MG
10000 TABLET, DELAYED RELEASE (ENTERIC COATED) ORAL
COMMUNITY

## 2023-10-13 RX ORDER — METFORMIN HYDROCHLORIDE 500 MG/1
1 TABLET, EXTENDED RELEASE ORAL DAILY
COMMUNITY
End: 2023-10-13

## 2023-10-13 RX ORDER — ECHINACEA 400 MG
CAPSULE ORAL
COMMUNITY

## 2023-10-13 RX ADMIN — LIDOCAINE HYDROCHLORIDE 10 ML: 5 INJECTION, SOLUTION INFILTRATION at 12:14

## 2023-10-13 RX ADMIN — DEXAMETHASONE SODIUM PHOSPHATE 10 MG: 4 INJECTION, SOLUTION INTRAMUSCULAR; INTRAVENOUS at 12:15

## 2023-10-13 RX ADMIN — IOHEXOL 3 ML: 350 INJECTION, SOLUTION INTRAVENOUS at 12:14

## 2023-10-13 ASSESSMENT — COLUMBIA-SUICIDE SEVERITY RATING SCALE - C-SSRS
1. IN THE PAST MONTH, HAVE YOU WISHED YOU WERE DEAD OR WISHED YOU COULD GO TO SLEEP AND NOT WAKE UP?: NO
6. HAVE YOU EVER DONE ANYTHING, STARTED TO DO ANYTHING, OR PREPARED TO DO ANYTHING TO END YOUR LIFE?: NO
2. HAVE YOU ACTUALLY HAD ANY THOUGHTS OF KILLING YOURSELF?: NO

## 2023-10-13 ASSESSMENT — PAIN SCALES - GENERAL
PAINLEVEL_OUTOF10: 9
PAINLEVEL_OUTOF10: 6

## 2023-10-13 ASSESSMENT — PAIN - FUNCTIONAL ASSESSMENT
PAIN_FUNCTIONAL_ASSESSMENT: 0-10
PAIN_FUNCTIONAL_ASSESSMENT: 0-10

## 2023-10-13 NOTE — PROCEDURES
General    Date/Time: 10/13/2023 12:09 PM    Performed by: Ilan Harvey MD  Authorized by: Ilan Harvey MD    Consent:     Consent obtained:  Written    Consent given by:  Patient    Risks, benefits, and alternatives were discussed: yes      Risks discussed:  Bleeding, infection, pain and nerve damage    Alternatives discussed:  No treatment, delayed treatment and alternative treatment  Universal protocol:     Procedure explained and questions answered to patient or proxy's satisfaction: yes      Relevant documents present and verified: yes      Test results available: yes      Imaging studies available: yes      Required blood products, implants, devices, and special equipment available: yes      Site/side marked: yes      Immediately prior to procedure, a time out was called: yes      Patient identity confirmed:  Verbally with patient, hospital-assigned identification number and arm band  Procedure specific details:      After informed consent was obtained, the patient was brought to the operating room and placed in the prone position. Standard blood pressure, heart rate and pulse oximetry monitors were applied. The back of the neck area was prepped and draped in the usual sterile fashion. Using fluoroscopic guidance, the skin and subcutaneous tissue overlying needle trajectory to the below interlaminar epidural space was anesthetized with 0.5% lidocaine. An 18-gauge Tuohy needle was then advanced into the below interlaminar location, and the epidural space was accessed using small advances with the needle. Entry into the epidural space was confirmed using loss of resistance technique with a glass syringe and 2 cc of air. Injection of contrast revealed appropriate spread in the epidural space without vascular uptake and was confirmed in the AP and lateral/contralateral views. Subsequently, the below medications were injected. The patient tolerated the procedure well. The needle was removed. Band-aid was  applied.   The patient was then transferred in stable condition to the PACU. The patient will call us with report over amount of pain control in the near future.    Level [C7-T1]  Medication [Dexamethasone 10mg/ml and 1 mL normal saline]

## 2023-10-16 DIAGNOSIS — M54.16 CHRONIC LUMBAR RADICULOPATHY: Primary | ICD-10-CM

## 2023-10-16 RX ORDER — HYDROCODONE BITARTRATE AND ACETAMINOPHEN 5; 325 MG/1; MG/1
1 TABLET ORAL DAILY
Qty: 30 TABLET | Refills: 0 | Status: SHIPPED | OUTPATIENT
Start: 2023-10-17

## 2023-10-16 NOTE — TELEPHONE ENCOUNTER
Pt is requesting refill of    Norco 5-325 mg 1 tablet daily **decreased dose**  [facility]                                                       LV:  9/13/23                  NV: 11/9/23                 OARRS reviewed with L[facility]:  9/17/23  #60/30 days                          Pended RX to Dr. Harvey for transmission to pharmacy.

## 2023-10-17 ENCOUNTER — PHARMACY VISIT (OUTPATIENT)
Dept: PHARMACY | Facility: CLINIC | Age: 68
End: 2023-10-17
Payer: COMMERCIAL

## 2023-10-17 PROCEDURE — RXMED WILLOW AMBULATORY MEDICATION CHARGE

## 2023-11-09 ENCOUNTER — PHARMACY VISIT (OUTPATIENT)
Dept: PHARMACY | Facility: CLINIC | Age: 68
End: 2023-11-09
Payer: COMMERCIAL

## 2023-11-09 ENCOUNTER — OFFICE VISIT (OUTPATIENT)
Dept: PAIN MEDICINE | Facility: HOSPITAL | Age: 68
End: 2023-11-09
Payer: MEDICARE

## 2023-11-09 VITALS
WEIGHT: 205.4 LBS | HEART RATE: 84 BPM | RESPIRATION RATE: 16 BRPM | SYSTOLIC BLOOD PRESSURE: 111 MMHG | DIASTOLIC BLOOD PRESSURE: 66 MMHG | BODY MASS INDEX: 33.01 KG/M2 | HEIGHT: 66 IN

## 2023-11-09 DIAGNOSIS — M17.0 OSTEOARTHRITIS OF BOTH KNEES, UNSPECIFIED OSTEOARTHRITIS TYPE: ICD-10-CM

## 2023-11-09 DIAGNOSIS — M54.16 CHRONIC LUMBAR RADICULOPATHY: ICD-10-CM

## 2023-11-09 DIAGNOSIS — M54.12 CERVICAL RADICULOPATHY, CHRONIC: Primary | ICD-10-CM

## 2023-11-09 PROBLEM — M19.90 OSTEOARTHRITIS: Status: ACTIVE | Noted: 2023-11-09

## 2023-11-09 PROCEDURE — 3074F SYST BP LT 130 MM HG: CPT | Performed by: CLINICAL NURSE SPECIALIST

## 2023-11-09 PROCEDURE — 1160F RVW MEDS BY RX/DR IN RCRD: CPT | Performed by: CLINICAL NURSE SPECIALIST

## 2023-11-09 PROCEDURE — 99214 OFFICE O/P EST MOD 30 MIN: CPT | Performed by: CLINICAL NURSE SPECIALIST

## 2023-11-09 PROCEDURE — 3078F DIAST BP <80 MM HG: CPT | Performed by: CLINICAL NURSE SPECIALIST

## 2023-11-09 PROCEDURE — 1159F MED LIST DOCD IN RCRD: CPT | Performed by: CLINICAL NURSE SPECIALIST

## 2023-11-09 PROCEDURE — RXMED WILLOW AMBULATORY MEDICATION CHARGE

## 2023-11-09 PROCEDURE — 1125F AMNT PAIN NOTED PAIN PRSNT: CPT | Performed by: CLINICAL NURSE SPECIALIST

## 2023-11-09 RX ORDER — DULOXETIN HYDROCHLORIDE 60 MG/1
60 CAPSULE, DELAYED RELEASE ORAL DAILY
Qty: 30 CAPSULE | Refills: 3 | Status: SHIPPED | OUTPATIENT
Start: 2023-11-09 | End: 2024-02-06 | Stop reason: SDUPTHER

## 2023-11-09 RX ORDER — GABAPENTIN 300 MG/1
900 CAPSULE ORAL SEE ADMIN INSTRUCTIONS
Qty: 90 CAPSULE | Refills: 3 | Status: SHIPPED | OUTPATIENT
Start: 2023-11-09 | End: 2024-02-06 | Stop reason: SDUPTHER

## 2023-11-09 RX ORDER — IBUPROFEN 800 MG/1
800 TABLET ORAL
Qty: 90 TABLET | Refills: 2 | Status: SHIPPED | OUTPATIENT
Start: 2023-11-09 | End: 2023-12-09

## 2023-11-09 ASSESSMENT — ENCOUNTER SYMPTOMS
OCCASIONAL FEELINGS OF UNSTEADINESS: 0
DEPRESSION: 0
LOSS OF SENSATION IN FEET: 0

## 2023-11-09 ASSESSMENT — PAIN SCALES - GENERAL: PAINLEVEL: 7

## 2023-11-09 ASSESSMENT — PATIENT HEALTH QUESTIONNAIRE - PHQ9
2. FEELING DOWN, DEPRESSED OR HOPELESS: NOT AT ALL
1. LITTLE INTEREST OR PLEASURE IN DOING THINGS: NOT AT ALL
SUM OF ALL RESPONSES TO PHQ9 QUESTIONS 1 AND 2: 0

## 2023-11-09 NOTE — ASSESSMENT & PLAN NOTE
68-year-old female with history of lumbar spinal stenosis/lumbar radiculopathy, cervical radiculopathy and polyarticular pain.  Previous injections lumbar spine have provided significant relief in the past.  Evaluated by Ortho/spine surgeon for cervical  radicular symptoms and recommended cervical MRI which she just recently completed.  Imaging reviewed and recommended NADER.  Recent cervical epidural steroid injection unfortunately provided limited relief.  Patient presents at today's office visit with symptoms consistent with a cervical radiculitis.  Pain radiating from her neck to bilateral shoulders as well as to the base of her neck.  She does have numbness and tingling intermittently and weakness to upper extremities right greater than left.  Patient also has symptoms consistent with lumbar radiculitis.  Pain radiates to bilateral lower extremities left greater than right she denies any numbness/tingling, weakness or changes in bowel/bladder function.  She has chronic polyarticular pain most bothersome to bilateral knees at today's visit.  She continues to manage her pain with gabapentin 600 mg at bedtime.  We discussed increasing this dose for better pain relief.  Patient is willing to try 600 mg at bedtime and at 300 mg in the morning.  She continues to use ibuprofen 800 mg as needed.  She feels that NSAID compounding cream has been beneficial for polyarticular pain.  She is weaning herself off hydrocodone.  She will continue Valium use for vertigo.  Plan discussed with patient at today's office visit.    -The patient will continue to wean off hydrocodone secondary to continuing Valium use for vertigo.  -Encourage patient to continue duloxetine which may be helpful for radicular/neuropathic and arthritic pain.  Advised totake with food to decrease nausea.  -We will increase gabapentin dose to 300 mg in the a.m. and 600 mg at bedtime for optimal pain relief. The patient was counseled on the risks and potential  side effects of gabapentin as well as its importance for dosage titration. Side effects included but were not limited to, drowsiness, sedation, cognitive decline, peripheral edema, weight gain, seizures, with abrupt withdrawal.  Patient was instructed to call the office with any concerns or side effects before abruptly stopping medication.   -Continue NSAID compounding cream for polyarticular pain.  -Patient will continue to supplement with ibuprofen 800 mg as needed.  Recent BMP reviewed with adequate renal function.   The patient was cautioned about possible side effects and risks of this medication including stomach upset, stomach ulcers, kidney risks and cardiovascular risks to include hypertension and slightly increased risks of stroke and myocardial infarction. Also discussed were ways to minimize these risks by taking this medication with food, staying well-hydrated, watching for any hypertension, and taking the medication with a stomach protectant such as pepcid, zantac, or a proton pump inhibitor.   -Added Nex wave electrotherapy.    -Patient would like to hold off on injections at this time.  If patient does not receive relief with current treatment she may benefit from surgical referral to evaluate cervical radicular symptoms.   -Patient may follow-up in 2 months or sooner if needed.

## 2023-11-09 NOTE — PROGRESS NOTES
Subjective   Patient ID: Malu Degroot is a 68 y.o. female who presents for cervical, lumbar and polyarticular pain.      HPI   68-year-old female with history of lumbar spinal stenosis/lumbar radiculopathy, cervical radiculopathy and polyarticular pain. Previous Injections in her lumbar spine have provided significant relief in the past.  Patient was previously evaluated by Ortho/spine surgeon at the [facility] for cervical radicular symptoms.  At that time she was sent for cervical MRI which she did not complete.  Due to persistent cervical radicular symptoms she was sent for cervical MRI through our office.  Cervical MRI previously reviewed by Dr. Harvey and c/w degeneration especially at C4-5 with foraminal stenosis likely causing the upper extremity neuritis.  Pain managed with Hydrocodone, gabapentin, IBU and NSAID compounding cream.  Discussed weaning patient off hydrocodone at her last office visit secondary to patient taking benzodiazepines for vertigo.  Patient is prescribed Valium which she takes when she has exacerbations of vertigo.  Patient states she cannot do without Valium and has chosen to wean off hydrocodone.  Added duloxetine for radicular/arthritic pain.  Scheduled patient for cervical epidural steroid injection.  Presents at today's office visit with cervical pain radiating to bilateral shoulders as well as to the base of her neck.  Patient endorses intermittent numbness and tingling as well as weakness to upper extremities right greater than left.  Patient also is experiencing chronic low back pain which radiates to bilateral lower extremities left greater than right.  Pain occasionally will radiate into left lower extremity to the level of her ankle.  She denies any numbness/tingling, weakness lower extremities or changes in bowel/bladder function.  Chronic polyarticular pain most bothersome to bilateral knees.  Pain described as aching, throbbing and occasionally sharp.  Pain  increased with movement of her upper extremities or rotation of her head.  Low back pain and polyarticular pain increased with standing and walking.  Recent cervical epidural steroid injection 10/13/2023 unfortunately provided no relief.  She has completed formal physical therapy in the past and continues home therapy exercises daily.  Continuing to manage her pain with gabapentin 600 mg at bedtime, ibuprofen as needed and NSAID compounding cream.  She is experiencing nausea with duloxetine, however, she feels it is tolerable.  Currently weaning off hydrocodone.  She has weaned down to 1/day with 8 tablets remaining.  Continues to benefit from heat    Patient states she has neck pain that goes up the back of the head.     Treatment Norco 5/325mg once a day  Count: 8  Last Taken: 11/8/23 evening  Fill Date: 10/17/23    Narcan 5/2024    10/13/23 Cervical ATA-patient states she got no relief from the injection. She states she feels worse then before the injection      UDS 9/13/23  OA 6/12/23  NARC 6/12/23  ORT 9/12/23  PPDUQ 6/12/23        OARRS:  No data recorded  I have personally reviewed the OARRS report for Malu Degroot. I have considered the risks of abuse, dependence, addiction and diversion    Is the patient prescribed a combination of a benzodiazepine and opioid?  No    Last Urine Drug Screen / ordered today: No  Recent Results (from the past 8760 hour(s))   OPIATE/OPIOID/BENZO PRESCRIPTION COMPLIANCE    Collection Time: 09/13/23 10:50 AM   Result Value Ref Range    DRUG SCREEN COMMENT URINE SEE BELOW     Creatine, Urine 155.8 mg/dL    Amphetamine Screen, Urine PRESUMPTIVE NEGATIVE NEGATIVE    Barbiturate Screen, Urine PRESUMPTIVE NEGATIVE NEGATIVE    Cannabinoid Screen, Urine PRESUMPTIVE NEGATIVE NEGATIVE    Cocaine Screen, Urine PRESUMPTIVE NEGATIVE NEGATIVE    PCP Screen, Urine PRESUMPTIVE NEGATIVE NEGATIVE    7-Aminoclonazepam <25 Cutoff <25 ng/mL    Alpha-Hydroxyalprazolam <25 Cutoff <25 ng/mL     Alpha-Hydroxymidazolam <25 Cutoff <25 ng/mL    Alprazolam <25 Cutoff <25 ng/mL    Chlordiazepoxide <25 Cutoff <25 ng/mL    Clonazepam <25 Cutoff <25 ng/mL    Diazepam <25 Cutoff <25 ng/mL    Lorazepam <25 Cutoff <25 ng/mL    Midazolam <25 Cutoff <25 ng/mL    Nordiazepam 32 (A) Cutoff <25 ng/mL    Oxazepam 106 (A) Cutoff <25 ng/mL    Temazepam 86 (A) Cutoff <25 ng/mL    Zolpidem <25 Cutoff <25 ng/mL    Zolpidem Metabolite (ZCA) <25 Cutoff <25 ng/mL    6-Acetylmorphine <25 Cutoff <25 ng/mL    Codeine <50 Cutoff <50 ng/mL    Hydrocodone 511 (A) Cutoff <25 ng/mL    Hydromorphone 231 (A) Cutoff <25 ng/mL    Morphine Urine <50 Cutoff <50 ng/mL    Norhydrocodone >1000 (A) Cutoff <25 ng/mL    Noroxycodone <25 Cutoff <25 ng/mL    Oxycodone <25 Cutoff <25 ng/mL    Oxymorphone <25 Cutoff <25 ng/mL    Tramadol <50 Cutoff <50 ng/mL    O-Desmethyltramadol <50 Cutoff <50 ng/mL    Fentanyl <2.5 Cutoff<2.5 ng/mL    Norfentanyl <2.5 Cutoff<2.5 ng/mL    METHADONE CONFIRMATION,URINE <25 Cutoff <25 ng/mL    EDDP <25 Cutoff <25 ng/mL   Buprenorphine Confirm,Urine    Collection Time: 09/13/23 10:50 AM   Result Value Ref Range    BUPRENORPHINE GLUC, URINE <5 ng/mL    BUPRENORPHINE ,URINE <2 ng/mL    NALOXONE, URINE <100 ng/mL    NORBUPRENORPHINE GLUC,URINE <5 ng/mL    NORBUPRENORPHINE, URINE <2 ng/mL   Tapentadol Confirmation, Urine    Collection Time: 09/13/23 10:50 AM   Result Value Ref Range    Tapentadol <25 Cutoff <25 ng/mL    N-Desmethyltapentadol <25 Cutoff <25 ng/mL     Results are as expected.     Controlled Substance Agreement:  Date of the Last Agreement: 6/12/23  Reviewed Controlled Substance Agreement including but not limited to the benefits, risks, and alternatives to treatment with a Controlled Substance medication(s).    Monitoring and compliance:    ORT: 9/13/23    PDUQ: 6/12/23    Office Agreement: 6/12/23      Review of Systems    ROS:   General: No fevers, chills, weight loss  Skin: Negative for lesions  Eyes: No acute  vision changes  Ears: No vertigo  Nose, mouth, throat: No difficulty swallowing or speaking  Respiratory: No cough, shortness of breath, cyanosis  Cardiovascular: Negative for chest pain syncope or palpitation  Gastrointestinal: No constipation, nausea, vomiting  Neurological: Negative for headache, positive for: Intermittent paresthesia and weakness  Psychological: Negative for severe or debilitating anxiety, depression. Negative memory loss  Musculoskeletal: Positive for arthralgia, myalgia and pain.  Endocrine: Negative for weight gain, appetite changes, excessive sweating  Allergy/immune: Negative    All 13 systems were reviewed and are within normal levels except as noted or in the history of present illness.  Positive or pertinent negative responses are noted or were in the history of present illness. As noted, the patient denies significant or impairing weakness in the bilateral upper and lower extremities, medication induced constipation, and bowel or bladder incontinence.     Current Outpatient Medications:     albuterol 90 mcg/actuation inhaler, Inhale 1-2 puffs every 6 hours if needed., Disp: , Rfl:     blood sugar diagnostic (Accu-Chek Katie Plus test strp) strip, 1 strip early in the morning.. PLEASE DISPENSE INSURANCE PREFERRED BRAND, Disp: , Rfl:     cholecalciferol (Vitamin D-3) 1,250 mcg (50,000 unit) capsule, Take 10,000 Units by mouth 1 (one) time per week., Disp: , Rfl:     cream base no.31, bulk, (Transdermal Pain Base) cream, Apply 1 Application topically see administration instructions. Apply as directed, Disp: , Rfl:     cyanocobalamin (Vitamin B-12) 500 mcg tablet, Take 1 tablet (500 mcg) by mouth once daily., Disp: , Rfl:     diazePAM (Valium) 5 mg tablet, Take 1 tablet (5 mg) by mouth once daily as needed., Disp: , Rfl:     dicyclomine (Bentyl) 20 mg tablet, Take 1 tablet (20 mg) by mouth., Disp: , Rfl:     DULoxetine (Cymbalta) 60 mg DR capsule, TAKE 1 CAPSULE ONCE DAILY, Disp: 30  capsule, Rfl: 3    elderberry fruit and flower 460-115 mg capsule, Take by mouth., Disp: , Rfl:     fluticasone (Flonase) 50 mcg/actuation nasal spray, Administer 2 sprays into each nostril once daily., Disp: , Rfl:     gabapentin (Neurontin) 300 mg capsule, Take 3 capsules (900 mg) by mouth once daily at bedtime., Disp: , Rfl:     HYDROcodone-acetaminophen (Norco) 5-325 mg tablet, Take 1 tablet by mouth once daily. Do not start before October 17, 2023., Disp: 30 tablet, Rfl: 0    ibuprofen 800 mg tablet, Take 1 tablet (800 mg) by mouth 3 times a day with meals., Disp: , Rfl:     multivitamin tablet, Take by mouth., Disp: , Rfl:     naloxone (Narcan) 4 mg/0.1 mL nasal spray, Administer 1 spray (4 mg) into affected nostril(s) if needed (if needed for overdose or respiratory depression)., Disp: , Rfl:     NON FORMULARY, APPLY 1-2 GRAMS (1-2 PUMPS) TOPICALLY EVERY 6-8 HOURS AS NEEDED FOR PAIN.(BACLOFEN 5%, CYCLOBENZAPRINE 2%, DICLOFENAC 3%, GABAPENTIN 10%, LIDOCAINE 5% IN SALTSTABLE), Disp: , Rfl:     ondansetron (Zofran) 4 mg tablet, Take 1 tablet (4 mg) by mouth 2 times a day as needed., Disp: , Rfl:     pantoprazole (ProtoNix) 40 mg EC tablet, Take 1 tablet (40 mg) by mouth once daily in the morning. Take before meals. Do not crush, chew or split, Disp: , Rfl:     propranolol LA (Inderal LA) 60 mg 24 hr capsule, Take 1 capsule (60 mg) by mouth once daily., Disp: , Rfl:     scopolamine (Transderm-Scop) 1 mg over 3 days patch 3 day, Place 1 patch on the skin every 3rd day., Disp: , Rfl:     semaglutide (Ozempic) 0.25 mg or 0.5 mg (2 mg/3 mL) pen injector, Inject under the skin., Disp: , Rfl:     SUMAtriptan (Imitrex) 6 mg/0.5 mL, Inject 1 Dose under the skin once daily as needed., Disp: , Rfl:     TRUEplus Lancets 33 gauge misc, AS DIRECTED TWICE A DAY 90 DAYS, Disp: , Rfl:      Past Medical History:   Diagnosis Date    Calculus of kidney 04/29/2019    Calcium kidney stone    Dependence on other enabling machines and  devices     BiPAP (biphasic positive airway pressure) dependence    Dependence on other enabling machines and devices     CPAP (continuous positive airway pressure) dependence    Generalized abdominal pain     Generalized abdominal pain    Irritable bowel syndrome without diarrhea 09/18/2019    Irritable bowel syndrome, unspecified type    Local infection of the skin and subcutaneous tissue, unspecified 08/04/2020    Infection of right foot    Low back pain, unspecified 04/05/2021    Lumbar pain    Other conditions influencing health status     Uses inhaler device    Other specified disorders of adrenal gland (CMS/HCC)     Adrenal mass    Pain in right finger(s) 07/10/2017    Pain of right thumb    Personal history of other diseases of the digestive system     History of esophageal reflux    Personal history of other diseases of the female genital tract     History of endometriosis    Personal history of other diseases of the musculoskeletal system and connective tissue 07/13/2017    History of lateral epicondylitis of right elbow    Personal history of other diseases of the musculoskeletal system and connective tissue     History of degenerative disc disease    Personal history of other diseases of the musculoskeletal system and connective tissue     History of chronic back pain    Personal history of other diseases of the musculoskeletal system and connective tissue     History of fibromyalgia    Personal history of other diseases of the musculoskeletal system and connective tissue     History of arthritis    Personal history of other diseases of the nervous system and sense organs 09/18/2019    History of Meniere's disease    Personal history of other diseases of the nervous system and sense organs     History of ear pain    Personal history of other diseases of the nervous system and sense organs     History of sleep apnea    Personal history of other diseases of the nervous system and sense organs     History  of migraine    Personal history of other diseases of urinary system     History of hematuria    Personal history of other malignant neoplasm of skin     History of malignant neoplasm of skin    Personal history of other mental and behavioral disorders     History of depression    Personal history of other specified conditions     History of dizziness    Personal history of urinary calculi     H/O renal calculi    Radiculopathy, lumbar region 10/05/2020    Bilateral lumbar radiculopathy    Shortness of breath     SOB (shortness of breath) on exertion        Past Surgical History:   Procedure Laterality Date    OTHER SURGICAL HISTORY  02/26/2021    Cyst excision    OTHER SURGICAL HISTORY  09/18/2019    Oophorectomy    OTHER SURGICAL HISTORY  09/18/2019    Carpal tunnel surgery    OTHER SURGICAL HISTORY  09/18/2019    Foot surgery    OTHER SURGICAL HISTORY  09/18/2019    Excision of basal cell carcinoma    OTHER SURGICAL HISTORY  02/05/2019    Cholecystectomy    OTHER SURGICAL HISTORY  02/05/2019    Hysterectomy    OTHER SURGICAL HISTORY  02/05/2019    Tonsillectomy    OTHER SURGICAL HISTORY  02/05/2019    Skin biopsy    OTHER SURGICAL HISTORY  09/18/2019    Wrist surgery    OTHER SURGICAL HISTORY  09/18/2019    Breast biopsy        Family History   Problem Relation Name Age of Onset    Drug abuse Mother      Stroke Mother      Coronary artery disease Mother      Diabetes Mother      Cancer Mother      Breast cancer Mother      Cancer Father      Skin cancer Father      Anxiety disorder Father      Depression Father      Cancer Sister      Skin cancer Sister      Cancer Brother      Esophageal cancer Brother      Skin cancer Brother          Allergies   Allergen Reactions    Cefdinir Nausea Only and Other     Stomach pain    Metformin Diarrhea and Nausea Only    Nitrofurantoin Unknown     nausea    Nortriptyline Unknown     Dizziness    Sulfamethoxazole-Trimethoprim Unknown    Sulfur Unknown    Buprenorphine Rash      patch irritates skin        Objective     There were no vitals taken for this visit.     Physical Exam    PE:  General: Well-developed, well-nourished, no acute distress. The patient demonstrates no pain behavior, symptom magnification or overt drug-seeking behavior.  Eye: Pupils appropriate for room lighting  Neck/thyroid: No obvious goiter or enlargement of neck noted  Respiratory exam: Normal respiratory effort, unlabored respiration. No accessory muscle use noted  Cardiac exam: Bilateral radial pulses intact  Abdominal: Nondistended  Spine, cervical: Tenderness to paraspinous musculature paracervical region left greater than right.  Flexion and extension exacerbate pain.  Rotational twisting increasing pain.  Multiple myofascial tender points trapezius muscles left greater than right.  Chronic elevation of left shoulder.  Spine, lumbar: The patient is able to rise from a seated to standing position without hesitancy, push off, or delay. Gait is grossly nonantalgic. Tenderness to paraspinous musculature is noted lower lumbar spine.  Extension exacerbating pain.  Neurologic exam: Muscle strength is antigravity in all 4 extremities.  Equal muscle strength bilateral upper and lower extremities.  Normal sensation bilateral upper and lower extremities.  Psychiatric exam: Judgment and insight normal, affect normal, speech is fluent, affect appropriate, demonstrating no signs of hypersomnolence, sedation, or confusion          Assessment/Plan   Problem List Items Addressed This Visit             ICD-10-CM    Cervical radiculopathy, chronic - Primary M54.12     68-year-old female with history of lumbar spinal stenosis/lumbar radiculopathy, cervical radiculopathy and polyarticular pain.  Previous injections lumbar spine have provided significant relief in the past.  Evaluated by Ortho/spine surgeon and recommended cervical MRI which she just recently completed.  Recent cervical epidural steroid injection unfortunately  provided limited relief.  Patient presents at today's office visit with symptoms consistent with a cervical radiculitis.  Pain radiating from her neck to bilateral shoulders as well as to the base of her neck.  She does have numbness and tingling intermittently and weakness to upper extremities right greater than left.  Patient also has symptoms consistent with lumbar radiculitis.  Pain radiates to bilateral lower extremities left greater than right she denies any numbness/tingling, weakness or changes in bowel/bladder function.  She has chronic polyarticular pain most bothersome to bilateral knees at today's visit.  She continues to manage her pain with gabapentin 600 mg at bedtime.  We discussed increasing this dose for better pain relief.  Patient is willing to try 600 mg at bedtime and at 300 mg in the morning.  She continues to use ibuprofen 800 mg as needed.  She feels that NSAID compounding cream has been beneficial for polyarticular pain.  She is weaning herself off hydrocodone.  She will continue Valium use for vertigo.  Plan discussed with patient at today's office visit.    -The patient will continue to wean off hydrocodone secondary to continuing Valium use for vertigo.  -Encourage patient to continue duloxetine which may be helpful for radicular/neuropathic and arthritic pain.  Advised totake with food to decrease nausea.  -We will increase gabapentin dose to 300 mg in the a.m. and 600 mg at bedtime for optimal pain relief. The patient was counseled on the risks and potential side effects of gabapentin as well as its importance for dosage titration. Side effects included but were not limited to, drowsiness, sedation, cognitive decline, peripheral edema, weight gain, seizures, with abrupt withdrawal.  Patient was instructed to call the office with any concerns or side effects before abruptly stopping medication.   -Continue NSAID compounding cream for polyarticular pain.  -Patient will continue to  supplement with ibuprofen 800 mg as needed.  Recent BMP reviewed with adequate renal function.   The patient was cautioned about possible side effects and risks of this medication including stomach upset, stomach ulcers, kidney risks and cardiovascular risks to include hypertension and slightly increased risks of stroke and myocardial infarction. Also discussed were ways to minimize these risks by taking this medication with food, staying well-hydrated, watching for any hypertension, and taking the medication with a stomach protectant such as pepcid, zantac, or a proton pump inhibitor.   -Added Nex wave electrotherapy.    -Patient would like to hold off on injections at this time.  Due to failure of conservative treatment patient may benefit from surgical referral to evaluate cervical radicular symptoms.   -Patient may follow-up in 2 months or sooner if needed.           Relevant Medications    DULoxetine (Cymbalta) 60 mg DR capsule    gabapentin (Neurontin) 300 mg capsule    Chronic lumbar radiculopathy M54.16    Relevant Medications    DULoxetine (Cymbalta) 60 mg DR capsule    gabapentin (Neurontin) 300 mg capsule    Osteoarthritis M19.90    Relevant Medications    ibuprofen 800 mg tablet

## 2023-11-10 ENCOUNTER — PHARMACY VISIT (OUTPATIENT)
Dept: PHARMACY | Facility: CLINIC | Age: 68
End: 2023-11-10
Payer: COMMERCIAL

## 2023-11-10 PROCEDURE — RXMED WILLOW AMBULATORY MEDICATION CHARGE

## 2023-11-10 RX ORDER — SEMAGLUTIDE 1.34 MG/ML
INJECTION, SOLUTION SUBCUTANEOUS
Qty: 9 ML | Refills: 1 | OUTPATIENT
Start: 2023-11-10

## 2023-12-03 ENCOUNTER — PHARMACY VISIT (OUTPATIENT)
Dept: PHARMACY | Facility: CLINIC | Age: 68
End: 2023-12-03
Payer: COMMERCIAL

## 2023-12-03 PROCEDURE — RXMED WILLOW AMBULATORY MEDICATION CHARGE

## 2023-12-13 PROCEDURE — RXMED WILLOW AMBULATORY MEDICATION CHARGE

## 2023-12-18 ENCOUNTER — PHARMACY VISIT (OUTPATIENT)
Dept: PHARMACY | Facility: CLINIC | Age: 68
End: 2023-12-18
Payer: COMMERCIAL

## 2024-01-03 PROCEDURE — RXMED WILLOW AMBULATORY MEDICATION CHARGE

## 2024-01-06 ENCOUNTER — PHARMACY VISIT (OUTPATIENT)
Dept: PHARMACY | Facility: CLINIC | Age: 69
End: 2024-01-06
Payer: COMMERCIAL

## 2024-01-17 PROCEDURE — RXMED WILLOW AMBULATORY MEDICATION CHARGE

## 2024-01-18 ENCOUNTER — PHARMACY VISIT (OUTPATIENT)
Dept: PHARMACY | Facility: CLINIC | Age: 69
End: 2024-01-18
Payer: COMMERCIAL

## 2024-01-28 PROCEDURE — RXMED WILLOW AMBULATORY MEDICATION CHARGE

## 2024-01-31 ENCOUNTER — PHARMACY VISIT (OUTPATIENT)
Dept: PHARMACY | Facility: CLINIC | Age: 69
End: 2024-01-31
Payer: COMMERCIAL

## 2024-02-06 ENCOUNTER — OFFICE VISIT (OUTPATIENT)
Dept: PAIN MEDICINE | Facility: HOSPITAL | Age: 69
End: 2024-02-06
Payer: MEDICARE

## 2024-02-06 VITALS
RESPIRATION RATE: 16 BRPM | WEIGHT: 196.7 LBS | DIASTOLIC BLOOD PRESSURE: 71 MMHG | HEART RATE: 90 BPM | BODY MASS INDEX: 31.61 KG/M2 | HEIGHT: 66 IN | SYSTOLIC BLOOD PRESSURE: 104 MMHG

## 2024-02-06 DIAGNOSIS — M54.2 CHRONIC NECK AND BACK PAIN: ICD-10-CM

## 2024-02-06 DIAGNOSIS — M54.16 CHRONIC LUMBAR RADICULOPATHY: ICD-10-CM

## 2024-02-06 DIAGNOSIS — M54.12 CERVICAL RADICULOPATHY, CHRONIC: ICD-10-CM

## 2024-02-06 DIAGNOSIS — M54.9 CHRONIC NECK AND BACK PAIN: ICD-10-CM

## 2024-02-06 DIAGNOSIS — M19.90 OSTEOARTHRITIS, UNSPECIFIED OSTEOARTHRITIS TYPE, UNSPECIFIED SITE: Primary | ICD-10-CM

## 2024-02-06 DIAGNOSIS — G89.29 CHRONIC NECK AND BACK PAIN: ICD-10-CM

## 2024-02-06 DIAGNOSIS — Z79.891 LONG TERM PRESCRIPTION OPIATE USE: ICD-10-CM

## 2024-02-06 PROCEDURE — 99214 OFFICE O/P EST MOD 30 MIN: CPT | Performed by: CLINICAL NURSE SPECIALIST

## 2024-02-06 PROCEDURE — 1159F MED LIST DOCD IN RCRD: CPT | Performed by: CLINICAL NURSE SPECIALIST

## 2024-02-06 PROCEDURE — 1160F RVW MEDS BY RX/DR IN RCRD: CPT | Performed by: CLINICAL NURSE SPECIALIST

## 2024-02-06 PROCEDURE — 1125F AMNT PAIN NOTED PAIN PRSNT: CPT | Performed by: CLINICAL NURSE SPECIALIST

## 2024-02-06 PROCEDURE — 3074F SYST BP LT 130 MM HG: CPT | Performed by: CLINICAL NURSE SPECIALIST

## 2024-02-06 PROCEDURE — 3078F DIAST BP <80 MM HG: CPT | Performed by: CLINICAL NURSE SPECIALIST

## 2024-02-06 RX ORDER — IBUPROFEN 800 MG/1
800 TABLET ORAL EVERY 8 HOURS PRN
COMMUNITY

## 2024-02-06 RX ORDER — DULOXETIN HYDROCHLORIDE 60 MG/1
60 CAPSULE, DELAYED RELEASE ORAL DAILY
Qty: 90 CAPSULE | Refills: 1 | Status: SHIPPED | OUTPATIENT
Start: 2024-02-06 | End: 2024-05-06

## 2024-02-06 RX ORDER — GABAPENTIN 300 MG/1
900 CAPSULE ORAL SEE ADMIN INSTRUCTIONS
Qty: 270 CAPSULE | Refills: 1 | Status: SHIPPED | OUTPATIENT
Start: 2024-02-06 | End: 2024-05-06

## 2024-02-06 ASSESSMENT — ENCOUNTER SYMPTOMS
OCCASIONAL FEELINGS OF UNSTEADINESS: 1
DEPRESSION: 0
LOSS OF SENSATION IN FEET: 1

## 2024-02-06 ASSESSMENT — PATIENT HEALTH QUESTIONNAIRE - PHQ9
SUM OF ALL RESPONSES TO PHQ9 QUESTIONS 1 AND 2: 0
2. FEELING DOWN, DEPRESSED OR HOPELESS: NOT AT ALL
1. LITTLE INTEREST OR PLEASURE IN DOING THINGS: NOT AT ALL

## 2024-02-06 NOTE — PROGRESS NOTES
Subjective   Patient ID: Malu Degroot is a 68 y.o. female who presents for cervical pain, lumbar pain and polyarticular pain    HPI    68-year-old female who presents for follow-up after recent cervical epidural steroid injection targeting cervical radicular symptoms.  History consistent with persistent vertigo for which she uses Valium.  Patient has a history of lumbar spinal stenosis with radicular symptoms in bilateral lower extremities as well as cervical radiculopathy in her upper extremities.  Previous injections for lumbar radicular symptoms have provided relief in the past.  Patient evaluated at the [facility] by Ortho/spine surgeon and sent for cervical MRI which was reviewed at her last office visit.  Cervical pain was most bothersome at her last office visit and she was scheduled for cervical epidural steroid injection.  Patient also has significant shoulder pain for which she has done a formal course of physical therapy.  Manages her pain with gabapentin 600 mg at bedtime and 300 mg in a.m., duloxetine 60 mg daily, NSAID compounding cream and occasional ibuprofen.  Patient elected to wean off opiates and continue Valium which she uses to target her vertigo.  Patient presents at today's office visit with chronic lumbar pain which radiates into bilateral hips and lower extremities most often to the level of her knees but occasionally to the level of her ankle on the left.  Chronic neuropathy bilateral feet.  She denies any increase in weakness or changes in bowel/bladder dysfunction.  She has chronic cervical pain which radiates to the base of her skull as well as into bilateral shoulders.  She does endorse numbness and tingling to bilateral hands with weakness to her hands right greater than left.  Pain is aching and throbbing.  Pain increased with use of upper extremities, standing and walking.  Unfortunately patient did not receive relief with recent cervical epidural steroid injection.   Continued polyarticular pain most bothersome to shoulders and knees.  Patient also experiencing left wrist pain and stiffness.  Continues to manage her pain with gabapentin 300 mg in the morning and 600 mg at bedtime, duloxetine 60 mg daily which she is tolerating without GI side effects.  She is also continuing to utilize NSAID compounding cream which she feels is helpful.  She successfully weaned off opiates.  She is utilizing ibuprofen sparingly.  Patient states that she was prescribed Celebrex by a different physician and has chosen not to use this medication at this time.  She feels that the nex wave electrotherapy unit has been helpful.  Continues to struggle with intermittent vertigo which she treats with Valium.    Patient presents to office for interval follow up and med count, states that she has neck pain that radiates up the back the head and across the bilateral shoulders. Patient also endorses numbness and tingling bilateral hands with occasional weakness right worse than left. Patient states today that she also has chronic low back pain that radiates into the bilateral hips and knees and down the left leg down with varying aspects of laterality to the ankle with numbness or tingling to both feet. Patient states that knee pain has increased bilaterally since last visit. Weaned off of Norco completely. Had to reschedule MRI due to nausea/vomiting/vertigo.    Pain Score: 9/10    Injections/Procedures: 10/13/2023 NADER-- patient states she had no relief from injection.     Neuromodulators/Other Medications: Duloxetine patient requesting 90 day supply through [facility] [address], gabapentin 600mg HS unsure if refill needed,     Other: Nex Wave with relief      OARRS:  No data recorded  I have personally reviewed the OARRS report for Malu Degroot. I have considered the risks of abuse, dependence, addiction and diversion    Is the patient prescribed a combination of a benzodiazepine and opioid?   No    Last Urine Drug Screen / ordered today: No 02/06/2024  Recent Results (from the past 8760 hour(s))   OPIATE/OPIOID/BENZO PRESCRIPTION COMPLIANCE    Collection Time: 09/13/23 10:50 AM   Result Value Ref Range    DRUG SCREEN COMMENT URINE SEE BELOW     Creatine, Urine 155.8 mg/dL    Amphetamine Screen, Urine PRESUMPTIVE NEGATIVE NEGATIVE    Barbiturate Screen, Urine PRESUMPTIVE NEGATIVE NEGATIVE    Cannabinoid Screen, Urine PRESUMPTIVE NEGATIVE NEGATIVE    Cocaine Screen, Urine PRESUMPTIVE NEGATIVE NEGATIVE    PCP Screen, Urine PRESUMPTIVE NEGATIVE NEGATIVE    7-Aminoclonazepam <25 Cutoff <25 ng/mL    Alpha-Hydroxyalprazolam <25 Cutoff <25 ng/mL    Alpha-Hydroxymidazolam <25 Cutoff <25 ng/mL    Alprazolam <25 Cutoff <25 ng/mL    Chlordiazepoxide <25 Cutoff <25 ng/mL    Clonazepam <25 Cutoff <25 ng/mL    Diazepam <25 Cutoff <25 ng/mL    Lorazepam <25 Cutoff <25 ng/mL    Midazolam <25 Cutoff <25 ng/mL    Nordiazepam 32 (A) Cutoff <25 ng/mL    Oxazepam 106 (A) Cutoff <25 ng/mL    Temazepam 86 (A) Cutoff <25 ng/mL    Zolpidem <25 Cutoff <25 ng/mL    Zolpidem Metabolite (ZCA) <25 Cutoff <25 ng/mL    6-Acetylmorphine <25 Cutoff <25 ng/mL    Codeine <50 Cutoff <50 ng/mL    Hydrocodone 511 (A) Cutoff <25 ng/mL    Hydromorphone 231 (A) Cutoff <25 ng/mL    Morphine Urine <50 Cutoff <50 ng/mL    Norhydrocodone >1000 (A) Cutoff <25 ng/mL    Noroxycodone <25 Cutoff <25 ng/mL    Oxycodone <25 Cutoff <25 ng/mL    Oxymorphone <25 Cutoff <25 ng/mL    Tramadol <50 Cutoff <50 ng/mL    O-Desmethyltramadol <50 Cutoff <50 ng/mL    Fentanyl <2.5 Cutoff<2.5 ng/mL    Norfentanyl <2.5 Cutoff<2.5 ng/mL    METHADONE CONFIRMATION,URINE <25 Cutoff <25 ng/mL    EDDP <25 Cutoff <25 ng/mL   Buprenorphine Confirm,Urine    Collection Time: 09/13/23 10:50 AM   Result Value Ref Range    BUPRENORPHINE GLUC, URINE <5 ng/mL    BUPRENORPHINE ,URINE <2 ng/mL    NALOXONE, URINE <100 ng/mL    NORBUPRENORPHINE GLUC,URINE <5 ng/mL    NORBUPRENORPHINE, URINE  <2 ng/mL   Tapentadol Confirmation, Urine    Collection Time: 09/13/23 10:50 AM   Result Value Ref Range    Tapentadol <25 Cutoff <25 ng/mL    N-Desmethyltapentadol <25 Cutoff <25 ng/mL     N/A    Controlled Substance Agreement: 6/12/2023  Date of the Last Agreement:       Monitoring and compliance:    ORT: 9/13/2023    PDUQ: 02/06/2024 score 4    Office Agreement: 6/12/2023      Review of Systems    ROS:   General: No fevers, chills, weight loss  Skin: Negative for lesions  Eyes: No acute vision changes  Ears: No vertigo  Nose, mouth, throat: No difficulty swallowing or speaking  Respiratory: No cough, shortness of breath, cyanosis  Cardiovascular: Negative for chest pain syncope or palpitation  Gastrointestinal: No constipation, nausea, vomiting  Neurological: Negative for headache, positive for: Paresthesia and weakness upper and lower extremities  Psychological: Negative for severe or debilitating anxiety, depression. Negative memory loss  Musculoskeletal: Positive for arthralgia, myalgia and pain  Endocrine: Negative for weight gain, appetite changes, excessive sweating  Allergy/immune: Negative    All 13 systems were reviewed and are within normal levels except as noted or in the history of present illness.  Positive or pertinent negative responses are noted or were in the history of present illness. As noted, the patient denies significant or impairing weakness in the bilateral upper and lower extremities, medication induced constipation, and bowel or bladder incontinence.     Current Outpatient Medications:     albuterol 90 mcg/actuation inhaler, Inhale 1-2 puffs every 6 hours if needed., Disp: , Rfl:     blood sugar diagnostic (Accu-Chek Katie Plus test strp) strip, 1 strip early in the morning.. PLEASE DISPENSE INSURANCE PREFERRED BRAND, Disp: , Rfl:     cholecalciferol (Vitamin D-3) 1,250 mcg (50,000 unit) capsule, Take 10,000 Units by mouth 1 (one) time per week., Disp: , Rfl:     cream base no.31, bulk,  (Transdermal Pain Base) cream, Apply 1 Application topically see administration instructions. Apply as directed, Disp: , Rfl:     cyanocobalamin (Vitamin B-12) 500 mcg tablet, Take 1 tablet (500 mcg) by mouth once daily., Disp: , Rfl:     diazePAM (Valium) 5 mg tablet, Take 1 tablet (5 mg) by mouth once daily as needed., Disp: , Rfl:     dicyclomine (Bentyl) 20 mg tablet, Take 1 tablet (20 mg) by mouth., Disp: , Rfl:     DULoxetine (Cymbalta) 60 mg DR capsule, TAKE 1 CAPSULE ONCE DAILY, Disp: 30 capsule, Rfl: 3    elderberry fruit and flower 460-115 mg capsule, Take by mouth., Disp: , Rfl:     fluticasone (Flonase) 50 mcg/actuation nasal spray, Administer 2 sprays into each nostril once daily., Disp: , Rfl:     gabapentin (Neurontin) 300 mg capsule, Take 3 capsules (900 mg) by mouth see administration instructions. Take 300mg in AM and 600mg at bedtime., Disp: 90 capsule, Rfl: 3    HYDROcodone-acetaminophen (Norco) 5-325 mg tablet, Take 1 tablet by mouth once daily. Do not start before October 17, 2023., Disp: 30 tablet, Rfl: 0    multivitamin tablet, Take by mouth., Disp: , Rfl:     naloxone (Narcan) 4 mg/0.1 mL nasal spray, Administer 1 spray (4 mg) into affected nostril(s) if needed (if needed for overdose or respiratory depression)., Disp: , Rfl:     NON FORMULARY, APPLY 1-2 GRAMS (1-2 PUMPS) TOPICALLY EVERY 6-8 HOURS AS NEEDED FOR PAIN.(BACLOFEN 5%, CYCLOBENZAPRINE 2%, DICLOFENAC 3%, GABAPENTIN 10%, LIDOCAINE 5% IN SALTSTABLE), Disp: , Rfl:     ondansetron (Zofran) 4 mg tablet, Take 1 tablet (4 mg) by mouth 2 times a day as needed., Disp: , Rfl:     pantoprazole (ProtoNix) 40 mg EC tablet, Take 1 tablet (40 mg) by mouth once daily in the morning. Take before meals. Do not crush, chew or split, Disp: , Rfl:     propranolol LA (Inderal LA) 60 mg 24 hr capsule, Take 1 capsule (60 mg) by mouth once daily., Disp: , Rfl:     scopolamine (Transderm-Scop) 1 mg over 3 days patch 3 day, Place 1 patch on the skin every  3rd day., Disp: , Rfl:     semaglutide (Ozempic) 0.25 mg or 0.5 mg (2 mg/3 mL) pen injector, Inject under the skin., Disp: , Rfl:     semaglutide (Ozempic) 1 mg/dose (4 mg/3 mL) pen injector, inject 1mg under the skin once a week as directed, Disp: 9 mL, Rfl: 1    SUMAtriptan (Imitrex) 6 mg/0.5 mL, Inject 1 Dose under the skin once daily as needed., Disp: , Rfl:     TRUEplus Lancets 33 gauge misc, AS DIRECTED TWICE A DAY 90 DAYS, Disp: , Rfl:      Past Medical History:   Diagnosis Date    Calculus of kidney 04/29/2019    Calcium kidney stone    Dependence on other enabling machines and devices     BiPAP (biphasic positive airway pressure) dependence    Dependence on other enabling machines and devices     CPAP (continuous positive airway pressure) dependence    Generalized abdominal pain     Generalized abdominal pain    Irritable bowel syndrome without diarrhea 09/18/2019    Irritable bowel syndrome, unspecified type    Local infection of the skin and subcutaneous tissue, unspecified 08/04/2020    Infection of right foot    Low back pain, unspecified 04/05/2021    Lumbar pain    Other conditions influencing health status     Uses inhaler device    Other specified disorders of adrenal gland (CMS/HCC)     Adrenal mass    Pain in right finger(s) 07/10/2017    Pain of right thumb    Personal history of other diseases of the digestive system     History of esophageal reflux    Personal history of other diseases of the female genital tract     History of endometriosis    Personal history of other diseases of the musculoskeletal system and connective tissue 07/13/2017    History of lateral epicondylitis of right elbow    Personal history of other diseases of the musculoskeletal system and connective tissue     History of degenerative disc disease    Personal history of other diseases of the musculoskeletal system and connective tissue     History of chronic back pain    Personal history of other diseases of the  musculoskeletal system and connective tissue     History of fibromyalgia    Personal history of other diseases of the musculoskeletal system and connective tissue     History of arthritis    Personal history of other diseases of the nervous system and sense organs 09/18/2019    History of Meniere's disease    Personal history of other diseases of the nervous system and sense organs     History of ear pain    Personal history of other diseases of the nervous system and sense organs     History of sleep apnea    Personal history of other diseases of the nervous system and sense organs     History of migraine    Personal history of other diseases of urinary system     History of hematuria    Personal history of other malignant neoplasm of skin     History of malignant neoplasm of skin    Personal history of other mental and behavioral disorders     History of depression    Personal history of other specified conditions     History of dizziness    Personal history of urinary calculi     H/O renal calculi    Radiculopathy, lumbar region 10/05/2020    Bilateral lumbar radiculopathy    Shortness of breath     SOB (shortness of breath) on exertion        Past Surgical History:   Procedure Laterality Date    OTHER SURGICAL HISTORY  02/26/2021    Cyst excision    OTHER SURGICAL HISTORY  09/18/2019    Oophorectomy    OTHER SURGICAL HISTORY  09/18/2019    Carpal tunnel surgery    OTHER SURGICAL HISTORY  09/18/2019    Foot surgery    OTHER SURGICAL HISTORY  09/18/2019    Excision of basal cell carcinoma    OTHER SURGICAL HISTORY  02/05/2019    Cholecystectomy    OTHER SURGICAL HISTORY  02/05/2019    Hysterectomy    OTHER SURGICAL HISTORY  02/05/2019    Tonsillectomy    OTHER SURGICAL HISTORY  02/05/2019    Skin biopsy    OTHER SURGICAL HISTORY  09/18/2019    Wrist surgery    OTHER SURGICAL HISTORY  09/18/2019    Breast biopsy        Family History   Problem Relation Name Age of Onset    Drug abuse Mother      Stroke Mother       Coronary artery disease Mother      Diabetes Mother      Cancer Mother      Breast cancer Mother      Cancer Father      Skin cancer Father      Anxiety disorder Father      Depression Father      Cancer Sister      Skin cancer Sister      Cancer Brother      Esophageal cancer Brother      Skin cancer Brother          Allergies   Allergen Reactions    Cefdinir Nausea Only and Other     Stomach pain    Metformin Diarrhea and Nausea Only    Nitrofurantoin Unknown     nausea    Nortriptyline Unknown     Dizziness    Sulfamethoxazole-Trimethoprim Unknown    Sulfur Unknown    Buprenorphine Rash     patch irritates skin        Objective     Visit Vitals  Smoking Status Unknown        Physical Exam    PE:  General: Well-developed, well-nourished, no acute distress. The patient demonstrates no pain behavior, symptom magnification or overt drug-seeking behavior.  Eye: Pupils appropriate for room lighting  Neck/thyroid: No obvious goiter or enlargement of neck noted  Respiratory exam: Normal respiratory effort, unlabored respiration. No accessory muscle use noted  Cardiac exam: Bilateral radial pulses intact  Abdominal: Nondistended  Spine, cervical: Patient able to move all extremities.  Flexion and extension intact with extension exacerbating pain.  Spine, lumbar: The patient is able to rise from a seated to standing position without hesitancy, push off, or delay. Gait is grossly nonantalgic. Tenderness to paraspinous musculature is noted lower lumbar spine.  Flexion intact with extension increasing symptoms.  Neurologic exam: Muscle strength is antigravity in all 4 extremities.  Diminished strength to right upper extremity 4/5 consistent with previous exam.   Psychiatric exam: Judgment and insight normal, affect normal, speech is fluent, affect appropriate, demonstrating no signs of hypersomnolence, sedation, or confusion        Assessment/Plan   Problem List Items Addressed This Visit             ICD-10-CM    Cervical  radiculopathy, chronic M54.12     68-year-old female who presents for follow-up for cervical radicular symptoms, lumbar radicular symptoms and polyarticular pain.  Patient has done well with previous injections targeting lumbar radicular symptoms.  Most recently patient was sent for cervical epidural steroid injection targeting cervical radicular symptoms which unfortunately did not provide relief.  Patient also has polyarticular pain most bothersome to shoulders and knees.  She is scheduled for evaluation by orthopedics for right wrist pain as well.  Patient states that she was evaluated by a different pain provider and was sent for an MRI and prescribed Celebrex.  Patient states that she does not want to follow with that pain provider and would like to continue being followed by this office.  She elected not to take the Celebrex and continue ibuprofen as needed.  She successfully weaned herself off hydrocodone.  She continues to benefit from an increased dose of gabapentin, duloxetine and NSAID compounding cream.  She has noted less need for ibuprofen in the last few months.  She continues to benefit from Nex wave electrotherapy unit.  Presents today with chronic cervical radicular symptoms, lumbar radicular symptoms and polyarticular symptoms.  She is not interested in pursuing further injections at this time.  She may consider in the future.  Current medication regimen is providing significant relief and allowing the patient to be as functional as possible.  She continues to struggle with vertigo and feels that Valium has been beneficial.  Plan discussed with patient moving forward.    We will continue gabapentin 300 mg in the morning and 600 mg at bedtime.  Tolerating without adverse effects.  The patient was counseled on the risks and potential side effects of gabapentin as well as its importance for dosage titration. Side effects included but were not limited to, drowsiness, sedation, cognitive decline,  peripheral edema, weight gain, seizures, with abrupt withdrawal.  Patient was instructed to call the office with any concerns or side effects before abruptly stopping medication.   -Continue duloxetine 60 mg daily.  She no longer is experiencing GI side effects with this medication.  We may consider increasing her dose of duloxetine to 90 mg in the future.  -Continue occasional ibuprofen 800 mg as needed.  Patient states she is taking sparingly.  The patient was cautioned about possible side effects and risks of this medication including stomach upset, stomach ulcers, kidney risks and cardiovascular risks to include hypertension and slightly increased risks of stroke and myocardial infarction. Also discussed were ways to minimize these risks by taking this medication with food, staying well-hydrated, watching for any hypertension, and taking the medication with a stomach protectant such as pepcid, zantac, or a proton pump inhibitor.   -Continue NSAID compounding cream  -Continue using Nex wave electrotherapy unit  -Further injections/interventions based on results of ordered MRI  -Patient may follow-up in 6 months or sooner if needed.         Relevant Medications    gabapentin (Neurontin) 300 mg capsule    DULoxetine (Cymbalta) 60 mg DR capsule    Chronic lumbar radiculopathy M54.16    Relevant Medications    gabapentin (Neurontin) 300 mg capsule    DULoxetine (Cymbalta) 60 mg DR capsule    Chronic neck and back pain M54.2, M54.9, G89.29    Long term prescription opiate use Z79.891    Osteoarthritis - Primary M19.90    Relevant Medications    DULoxetine (Cymbalta) 60 mg DR capsule

## 2024-02-06 NOTE — ASSESSMENT & PLAN NOTE
68-year-old female who presents for follow-up for cervical radicular symptoms, lumbar radicular symptoms and polyarticular pain.  Patient has done well with previous injections targeting lumbar radicular symptoms.  Most recently patient was sent for cervical epidural steroid injection targeting cervical radicular symptoms which unfortunately did not provide relief.  Patient also has polyarticular pain most bothersome to shoulders and knees.  She is scheduled for evaluation by orthopedics for right wrist pain as well.  Patient states that she was evaluated by a different pain provider and was sent for an MRI and prescribed Celebrex.  Patient states that she does not want to follow with that pain provider and would like to continue being followed by this office.  She elected not to take the Celebrex and continue ibuprofen as needed.  She successfully weaned herself off hydrocodone.  She continues to benefit from an increased dose of gabapentin, duloxetine and NSAID compounding cream.  She has noted less need for ibuprofen in the last few months.  She continues to benefit from Nex wave electrotherapy unit.  Presents today with chronic cervical radicular symptoms, lumbar radicular symptoms and polyarticular symptoms.  She is not interested in pursuing further injections at this time.  She may consider in the future.  Current medication regimen is providing significant relief and allowing the patient to be as functional as possible.  She continues to struggle with vertigo and feels that Valium has been beneficial.  Plan discussed with patient moving forward.    We will continue gabapentin 300 mg in the morning and 600 mg at bedtime.  Tolerating without adverse effects.  The patient was counseled on the risks and potential side effects of gabapentin as well as its importance for dosage titration. Side effects included but were not limited to, drowsiness, sedation, cognitive decline, peripheral edema, weight gain,  seizures, with abrupt withdrawal.  Patient was instructed to call the office with any concerns or side effects before abruptly stopping medication.   -Continue duloxetine 60 mg daily.  She no longer is experiencing GI side effects with this medication.  We may consider increasing her dose of duloxetine to 90 mg in the future.  -Continue occasional ibuprofen 800 mg as needed.  Patient states she is taking sparingly.  The patient was cautioned about possible side effects and risks of this medication including stomach upset, stomach ulcers, kidney risks and cardiovascular risks to include hypertension and slightly increased risks of stroke and myocardial infarction. Also discussed were ways to minimize these risks by taking this medication with food, staying well-hydrated, watching for any hypertension, and taking the medication with a stomach protectant such as pepcid, zantac, or a proton pump inhibitor.   -Continue NSAID compounding cream  -Continue using Nex wave electrotherapy unit  -Further injections/interventions based on results of ordered MRI  -Patient may follow-up in 6 months or sooner if needed.

## 2024-02-14 DIAGNOSIS — M17.0 BILATERAL PRIMARY OSTEOARTHRITIS OF KNEE: ICD-10-CM

## 2024-02-14 RX ORDER — IBUPROFEN 800 MG/1
TABLET ORAL
Qty: 90 TABLET | Refills: 2 | OUTPATIENT
Start: 2024-02-14

## 2024-02-24 PROCEDURE — RXMED WILLOW AMBULATORY MEDICATION CHARGE

## 2024-02-25 ENCOUNTER — PHARMACY VISIT (OUTPATIENT)
Dept: PHARMACY | Facility: CLINIC | Age: 69
End: 2024-02-25
Payer: COMMERCIAL

## 2024-03-25 PROCEDURE — RXMED WILLOW AMBULATORY MEDICATION CHARGE

## 2024-03-27 ENCOUNTER — PHARMACY VISIT (OUTPATIENT)
Dept: PHARMACY | Facility: CLINIC | Age: 69
End: 2024-03-27
Payer: COMMERCIAL

## 2024-04-03 PROCEDURE — 87086 URINE CULTURE/COLONY COUNT: CPT

## 2024-04-03 PROCEDURE — 87186 SC STD MICRODIL/AGAR DIL: CPT

## 2024-04-04 ENCOUNTER — LAB REQUISITION (OUTPATIENT)
Dept: LAB | Facility: HOSPITAL | Age: 69
End: 2024-04-04
Payer: MEDICARE

## 2024-04-04 DIAGNOSIS — N39.0 URINARY TRACT INFECTION, SITE NOT SPECIFIED: ICD-10-CM

## 2024-04-06 LAB — BACTERIA UR CULT: ABNORMAL

## 2024-04-08 RX ORDER — IBUPROFEN 800 MG/1
TABLET ORAL
Qty: 90 TABLET | Refills: 2 | OUTPATIENT
Start: 2024-04-08

## 2024-05-02 PROCEDURE — RXMED WILLOW AMBULATORY MEDICATION CHARGE

## 2024-05-03 ENCOUNTER — PHARMACY VISIT (OUTPATIENT)
Dept: PHARMACY | Facility: CLINIC | Age: 69
End: 2024-05-03
Payer: COMMERCIAL

## 2024-05-29 PROCEDURE — RXMED WILLOW AMBULATORY MEDICATION CHARGE

## 2024-06-02 ENCOUNTER — PHARMACY VISIT (OUTPATIENT)
Dept: PHARMACY | Facility: CLINIC | Age: 69
End: 2024-06-02
Payer: COMMERCIAL

## 2024-06-23 ENCOUNTER — PHARMACY VISIT (OUTPATIENT)
Dept: PHARMACY | Facility: CLINIC | Age: 69
End: 2024-06-23
Payer: COMMERCIAL

## 2024-06-23 PROCEDURE — RXMED WILLOW AMBULATORY MEDICATION CHARGE

## 2024-06-30 PROCEDURE — RXMED WILLOW AMBULATORY MEDICATION CHARGE

## 2024-07-03 ENCOUNTER — PHARMACY VISIT (OUTPATIENT)
Dept: PHARMACY | Facility: CLINIC | Age: 69
End: 2024-07-03
Payer: COMMERCIAL

## 2024-07-28 PROCEDURE — RXMED WILLOW AMBULATORY MEDICATION CHARGE

## 2024-08-01 ENCOUNTER — PHARMACY VISIT (OUTPATIENT)
Dept: PHARMACY | Facility: CLINIC | Age: 69
End: 2024-08-01
Payer: COMMERCIAL

## 2024-08-06 ENCOUNTER — OFFICE VISIT (OUTPATIENT)
Dept: PAIN MEDICINE | Facility: HOSPITAL | Age: 69
End: 2024-08-06
Payer: MEDICARE

## 2024-08-06 VITALS
BODY MASS INDEX: 30.7 KG/M2 | DIASTOLIC BLOOD PRESSURE: 57 MMHG | WEIGHT: 191 LBS | OXYGEN SATURATION: 94 % | RESPIRATION RATE: 16 BRPM | SYSTOLIC BLOOD PRESSURE: 92 MMHG | HEIGHT: 66 IN | HEART RATE: 86 BPM

## 2024-08-06 DIAGNOSIS — M19.90 OSTEOARTHRITIS, UNSPECIFIED OSTEOARTHRITIS TYPE, UNSPECIFIED SITE: ICD-10-CM

## 2024-08-06 DIAGNOSIS — M54.16 CHRONIC LUMBAR RADICULOPATHY: Primary | ICD-10-CM

## 2024-08-06 DIAGNOSIS — M54.12 CERVICAL RADICULOPATHY, CHRONIC: ICD-10-CM

## 2024-08-06 DIAGNOSIS — G62.9 NEUROPATHY: ICD-10-CM

## 2024-08-06 PROCEDURE — 1160F RVW MEDS BY RX/DR IN RCRD: CPT | Performed by: CLINICAL NURSE SPECIALIST

## 2024-08-06 PROCEDURE — 3078F DIAST BP <80 MM HG: CPT | Performed by: CLINICAL NURSE SPECIALIST

## 2024-08-06 PROCEDURE — 99214 OFFICE O/P EST MOD 30 MIN: CPT | Performed by: CLINICAL NURSE SPECIALIST

## 2024-08-06 PROCEDURE — 1159F MED LIST DOCD IN RCRD: CPT | Performed by: CLINICAL NURSE SPECIALIST

## 2024-08-06 PROCEDURE — 3074F SYST BP LT 130 MM HG: CPT | Performed by: CLINICAL NURSE SPECIALIST

## 2024-08-06 PROCEDURE — 3008F BODY MASS INDEX DOCD: CPT | Performed by: CLINICAL NURSE SPECIALIST

## 2024-08-06 RX ORDER — DULOXETIN HYDROCHLORIDE 60 MG/1
60 CAPSULE, DELAYED RELEASE ORAL DAILY
Qty: 90 CAPSULE | Refills: 1 | Status: SHIPPED | OUTPATIENT
Start: 2024-08-06 | End: 2024-11-04

## 2024-08-06 RX ORDER — PREGABALIN 75 MG/1
75 CAPSULE ORAL 2 TIMES DAILY
Qty: 60 CAPSULE | Refills: 2 | Status: SHIPPED | OUTPATIENT
Start: 2024-08-06 | End: 2024-09-05

## 2024-08-06 ASSESSMENT — COLUMBIA-SUICIDE SEVERITY RATING SCALE - C-SSRS
2. HAVE YOU ACTUALLY HAD ANY THOUGHTS OF KILLING YOURSELF?: NO
1. IN THE PAST MONTH, HAVE YOU WISHED YOU WERE DEAD OR WISHED YOU COULD GO TO SLEEP AND NOT WAKE UP?: NO
6. HAVE YOU EVER DONE ANYTHING, STARTED TO DO ANYTHING, OR PREPARED TO DO ANYTHING TO END YOUR LIFE?: NO

## 2024-08-06 ASSESSMENT — ENCOUNTER SYMPTOMS
LOSS OF SENSATION IN FEET: 1
OCCASIONAL FEELINGS OF UNSTEADINESS: 0
DEPRESSION: 0

## 2024-08-06 NOTE — ASSESSMENT & PLAN NOTE
68-year-old female who presents for follow-up for cervical radicular symptoms, lumbar radicular symptoms and polyarticular pain.  Patient has done well with previous injections targeting lumbar radicular symptoms.  Most recently patient was sent for cervical epidural steroid injection targeting cervical radicular symptoms which unfortunately did not provide relief.  Patient also has polyarticular pain most bothersome to shoulders and knees.  Evaluated by Ortho/spine surgeon for cervical pain and per patient not offered a surgical intervention at this time.  She is not interested in injections at this time.  She would like to continue medication management for current symptoms.  Continues home therapy stretches and exercises as able.  Currently is most bothered by neuropathic pain to bilateral hands and feet.  She does not feel that her gabapentin is providing adequate relief.  We discussed a rotation to Lyrica to provide additional neuropathic/radicular pain relief.  Suggested Lyrica 75 mg twice daily for additional neuropathic/radicular pain relief.  Patient is in agreement and would like to proceed with a rotation to Lyrica.  Advised patient to decrease her gabapentin to 300 mg in the morning and 300 mg at bedtime for 2 to 3 days prior to starting Lyrica 75 mg twice daily.  Reviewed potential side effects with patient.  Continues to supplement with ibuprofen as needed.  Continued benefit with duloxetine and NSAID compounding cream.  She also continues to benefit from Valium which treats her symptoms of vertigo. Plan discussed with patient at today's visit.      Rotate patient from gabapentin to Lyrica 75 mg twice daily for additional neuropathic/radicular pain relief.  Reviewed potential side effects with patient.  Advised to decrease her gabapentin dose to 300 mg in the morning and 300 mg at bedtime for 2 to 3 days prior to starting Lyrica.  We will obtain a controlled substance agreement and a UDS at the  patient's next office visit if she tolerates Lyrica.  -Continue duloxetine 60 mg daily.  States that GI side effects are tolerable.  May consider increasing her dose of duloxetine to 90 mg in the future.  -Continue occasional ibuprofen 800 mg as needed.  Patient states she is taking sparingly.  The patient was cautioned about possible side effects and risks of this medication including stomach upset, stomach ulcers, kidney risks and cardiovascular risks to include hypertension and slightly increased risks of stroke and myocardial infarction. Also discussed were ways to minimize these risks by taking this medication with food, staying well-hydrated, watching for any hypertension, and taking the medication with a stomach protectant such as pepcid, zantac, or a proton pump inhibitor.   -Continue NSAID compounding cream  -Continue using Nex wave electrotherapy unit  -Patient may benefit from further injections targeting cervical radicular symptoms as well as lumbar radicular symptoms in the future.  She may consider injections if symptoms should worsen.  -Encourage patient to continue home therapy exercises and stretches targeting cervical, low back pain as well as polyarticular pain.  -Patient may follow-up in 6 weeks for reevaluation and tolerance of current medication regimen.      Disclaimer: This note was transcribed using an audio transcription device.  As such, minor errors may be present with regard to spelling, punctuation, and inadvertent word insertion.  Please disregard such errors.

## 2024-08-06 NOTE — PROGRESS NOTES
Subjective   Patient ID: Malu Degroot is a 68 y.o. female who presents for cervical pain, lumbar pain and polyarticular pain      HPI    68-year-old female with history of cervical radicular symptoms as well as lumbar radicular symptoms and polyarticular pain presents for follow-up.  Previous cervical epidural steroid injection provided limited relief.  Previous injections for lumbar radicular symptoms have provided relief in the past.  Not interested in additional injections at this time.  She was evaluated at the [facility] by Ortho/spine surgeon for cervical pain and sent for cervical MRI.  Cervical MRI consistent with facet osteoarthritis at C4-5 with mild bilateral foraminal stenosis; no central stenosis noted.  Patient states that Ortho/spine surgeon not recommending surgical intervention for cervical pain at this time.  Manages her pain with gabapentin 300 mg in the morning and 600 mg at bedtime, duloxetine 60 mg daily, NSAID compounding cream and ibuprofen.  She also utilizes Valium for significant vertigo.  Patient previously weaned herself off hydrocodone.  Continued benefit with a TENS unit.  Presents at today's office visit chronic cervical pain radiating into the base of her head and also into bilateral shoulders accompanied by intermittent numbness and tingling to bilateral hands.  Weakness in her right hand more than her left.  Chronic low back pain which radiates into bilateral hips and into her lower extremities to the level of her knees.  She has chronic numbness and tingling in bilateral feet.  She denies any increasing weakness or changes in bowel/bladder function.  Persistent polyarticular pain.  Pain is aching, throbbing and can be burning.  Pain increased with the use of her upper extremities and rotation of her head.  Low back pain increased with standing, bending and walking.  Most bothered by persistent neuropathic pain to bilateral hands and feet.  She does not feel that gabapentin  has been effective for neuropathic/radicular symptoms.  Continued benefit with duloxetine 60 mg daily and NSAID compounding cream.  She will supplement with ibuprofen when needed.  Continued benefit with Valium for symptoms of vertigo.      Patient presents to office for interval follow up and med count, states that she has neck pain that radiates up the back the head and across the bilateral shoulders. Patient also endorses numbness and tingling bilateral hands with occasional weakness right worse than left. Chronic low back pain that radiates into the bilateral hips and knees and down the left leg down with varying aspects of laterality to the ankle with numbness or tingling to both feet.     Pain Score: 4/10    Injections/Procedures: 10/13/2023 NADER-- patient states she had no relief from injection.     Neuromodulators/Other Medications: [facility] [address] [facility], gabapentin 300mg in am and 600mg pm 90 day supply-needs refill on both-would like to discuss rotating to Lyrica/ Compound cream-[facility] needs refill    Other: Nex Wave with relief  OARRS:  Ariella Bazan, APRN-CNP, APRN-CNS on 8/6/2024 11:58 AM  I have personally reviewed the OARRS report for Malu Degroot. I have considered the risks of abuse, dependence, addiction and diversion    Is the patient prescribed a combination of a benzodiazepine and opioid?  No    Last Urine Drug Screen / ordered today: No  Recent Results (from the past 8760 hour(s))   OPIATE/OPIOID/BENZO PRESCRIPTION COMPLIANCE    Collection Time: 09/13/23 10:50 AM   Result Value Ref Range    DRUG SCREEN COMMENT URINE SEE BELOW     Creatine, Urine 155.8 mg/dL    Amphetamine Screen, Urine PRESUMPTIVE NEGATIVE NEGATIVE    Barbiturate Screen, Urine PRESUMPTIVE NEGATIVE NEGATIVE    Cannabinoid Screen, Urine PRESUMPTIVE NEGATIVE NEGATIVE    Cocaine Screen, Urine PRESUMPTIVE NEGATIVE NEGATIVE    PCP Screen, Urine PRESUMPTIVE NEGATIVE NEGATIVE     7-Aminoclonazepam <25 Cutoff <25 ng/mL    Alpha-Hydroxyalprazolam <25 Cutoff <25 ng/mL    Alpha-Hydroxymidazolam <25 Cutoff <25 ng/mL    Alprazolam <25 Cutoff <25 ng/mL    Chlordiazepoxide <25 Cutoff <25 ng/mL    Clonazepam <25 Cutoff <25 ng/mL    Diazepam <25 Cutoff <25 ng/mL    Lorazepam <25 Cutoff <25 ng/mL    Midazolam <25 Cutoff <25 ng/mL    Nordiazepam 32 (A) Cutoff <25 ng/mL    Oxazepam 106 (A) Cutoff <25 ng/mL    Temazepam 86 (A) Cutoff <25 ng/mL    Zolpidem <25 Cutoff <25 ng/mL    Zolpidem Metabolite (ZCA) <25 Cutoff <25 ng/mL    6-Acetylmorphine <25 Cutoff <25 ng/mL    Codeine <50 Cutoff <50 ng/mL    Hydrocodone 511 (A) Cutoff <25 ng/mL    Hydromorphone 231 (A) Cutoff <25 ng/mL    Morphine Urine <50 Cutoff <50 ng/mL    Norhydrocodone >1000 (A) Cutoff <25 ng/mL    Noroxycodone <25 Cutoff <25 ng/mL    Oxycodone <25 Cutoff <25 ng/mL    Oxymorphone <25 Cutoff <25 ng/mL    Tramadol <50 Cutoff <50 ng/mL    O-Desmethyltramadol <50 Cutoff <50 ng/mL    Fentanyl <2.5 Cutoff<2.5 ng/mL    Norfentanyl <2.5 Cutoff<2.5 ng/mL    METHADONE CONFIRMATION,URINE <25 Cutoff <25 ng/mL    EDDP <25 Cutoff <25 ng/mL   Buprenorphine Confirm,Urine    Collection Time: 09/13/23 10:50 AM   Result Value Ref Range    BUPRENORPHINE GLUC, URINE <5 ng/mL    BUPRENORPHINE ,URINE <2 ng/mL    NALOXONE, URINE <100 ng/mL    NORBUPRENORPHINE GLUC,URINE <5 ng/mL    NORBUPRENORPHINE, URINE <2 ng/mL   Tapentadol Confirmation, Urine    Collection Time: 09/13/23 10:50 AM   Result Value Ref Range    Tapentadol <25 Cutoff <25 ng/mL    N-Desmethyltapentadol <25 Cutoff <25 ng/mL     N/A    Controlled Substance Agreement:  Date of the Last Agreement:       Monitoring and compliance:    ORT:    PDUQ:    Office Agreement:      Review of Systems    ROS:   General: No fevers, chills, weight loss  Skin: Negative for lesions  Eyes: No acute vision changes  Ears: No vertigo  Nose, mouth, throat: No difficulty swallowing or speaking  Respiratory: No cough,  shortness of breath, cyanosis  Cardiovascular: Negative for chest pain syncope or palpitation  Gastrointestinal: No constipation, nausea, vomiting  Neurological: Negative for headache, positive for: Chronic paresthesia,weakness and vertigo  Psychological: Negative for severe or debilitating anxiety, depression. Negative memory loss  Musculoskeletal: Positive for arthralgia, myalgia and pain  Endocrine: Negative for weight gain, appetite changes, excessive sweating  Allergy/immune: Negative    All 13 systems were reviewed and are within normal levels except as noted or in the history of present illness.  Positive or pertinent negative responses are noted or were in the history of present illness. As noted, the patient denies significant or impairing weakness in the bilateral upper and lower extremities, medication induced constipation, and bowel or bladder incontinence.     Current Outpatient Medications:     albuterol 90 mcg/actuation inhaler, Inhale 1-2 puffs every 6 hours if needed., Disp: , Rfl:     blood sugar diagnostic (Accu-Chek Katie Plus test strp) strip, 1 strip early in the morning.. PLEASE DISPENSE INSURANCE PREFERRED BRAND, Disp: , Rfl:     cholecalciferol (Vitamin D-3) 1,250 mcg (50,000 unit) capsule, Take 10,000 Units by mouth 1 (one) time per week., Disp: , Rfl:     cream base no.31, bulk, (Transdermal Pain Base) cream, Apply 1 Application topically see administration instructions. Apply as directed, Disp: , Rfl:     cyanocobalamin (Vitamin B-12) 500 mcg tablet, Take 1 tablet (500 mcg) by mouth once daily., Disp: , Rfl:     diazePAM (Valium) 5 mg tablet, Take 1 tablet (5 mg) by mouth once daily as needed., Disp: , Rfl:     dicyclomine (Bentyl) 20 mg tablet, Take 1 tablet (20 mg) by mouth., Disp: , Rfl:     elderberry fruit and flower 460-115 mg capsule, Take by mouth., Disp: , Rfl:     fluticasone (Flonase) 50 mcg/actuation nasal spray, Administer 2 sprays into each nostril once daily., Disp: , Rfl:      ibuprofen 800 mg tablet, Take 1 tablet (800 mg) by mouth every 8 hours if needed for mild pain (1 - 3)., Disp: , Rfl:     multivitamin tablet, Take by mouth., Disp: , Rfl:     NON FORMULARY, APPLY 1-2 GRAMS (1-2 PUMPS) TOPICALLY EVERY 6-8 HOURS AS NEEDED FOR PAIN.(BACLOFEN 5%, CYCLOBENZAPRINE 2%, DICLOFENAC 3%, GABAPENTIN 10%, LIDOCAINE 5% IN SALTSTABLE), Disp: , Rfl:     ondansetron (Zofran) 4 mg tablet, Take 1 tablet (4 mg) by mouth 2 times a day as needed., Disp: , Rfl:     pantoprazole (ProtoNix) 40 mg EC tablet, Take 1 tablet (40 mg) by mouth once daily in the morning. Take before meals. Do not crush, chew or split, Disp: , Rfl:     scopolamine (Transderm-Scop) 1 mg over 3 days patch 3 day, Place 1 patch on the skin every 3rd day., Disp: , Rfl:     semaglutide (Ozempic) 1 mg/dose (4 mg/3 mL) pen injector, Inject 1 mg under the skin 1 (one) time per week., Disp: 9 mL, Rfl: 1    SUMAtriptan (Imitrex) 6 mg/0.5 mL, Inject 1 Dose under the skin once daily as needed., Disp: , Rfl:     TRUEplus Lancets 33 gauge misc, AS DIRECTED TWICE A DAY 90 DAYS, Disp: , Rfl:     DULoxetine (Cymbalta) 60 mg DR capsule, TAKE 1 CAPSULE ONCE DAILY, Disp: 90 capsule, Rfl: 1    HYDROcodone-acetaminophen (Norco) 5-325 mg tablet, Take 1 tablet by mouth once daily. Do not start before October 17, 2023. (Patient not taking: Reported on 2/6/2024), Disp: 30 tablet, Rfl: 0    naloxone (Narcan) 4 mg/0.1 mL nasal spray, Administer 1 spray (4 mg) into affected nostril(s) if needed (if needed for overdose or respiratory depression)., Disp: , Rfl:     pregabalin (Lyrica) 75 mg capsule, Take 1 capsule (75 mg) by mouth 2 times a day., Disp: 60 capsule, Rfl: 2    propranolol LA (Inderal LA) 60 mg 24 hr capsule, Take 1 capsule (60 mg) by mouth once daily., Disp: , Rfl:      Past Medical History:   Diagnosis Date    Calculus of kidney 04/29/2019    Calcium kidney stone    Dependence on other enabling machines and devices     BiPAP (biphasic positive  airway pressure) dependence    Dependence on other enabling machines and devices     CPAP (continuous positive airway pressure) dependence    Generalized abdominal pain     Generalized abdominal pain    Irritable bowel syndrome without diarrhea 09/18/2019    Irritable bowel syndrome, unspecified type    Local infection of the skin and subcutaneous tissue, unspecified 08/04/2020    Infection of right foot    Low back pain, unspecified 04/05/2021    Lumbar pain    Other conditions influencing health status     Uses inhaler device    Other specified disorders of adrenal gland (Multi)     Adrenal mass    Pain in right finger(s) 07/10/2017    Pain of right thumb    Personal history of other diseases of the digestive system     History of esophageal reflux    Personal history of other diseases of the female genital tract     History of endometriosis    Personal history of other diseases of the musculoskeletal system and connective tissue 07/13/2017    History of lateral epicondylitis of right elbow    Personal history of other diseases of the musculoskeletal system and connective tissue     History of degenerative disc disease    Personal history of other diseases of the musculoskeletal system and connective tissue     History of chronic back pain    Personal history of other diseases of the musculoskeletal system and connective tissue     History of fibromyalgia    Personal history of other diseases of the musculoskeletal system and connective tissue     History of arthritis    Personal history of other diseases of the nervous system and sense organs 09/18/2019    History of Meniere's disease    Personal history of other diseases of the nervous system and sense organs     History of ear pain    Personal history of other diseases of the nervous system and sense organs     History of sleep apnea    Personal history of other diseases of the nervous system and sense organs     History of migraine    Personal history of other  diseases of urinary system     History of hematuria    Personal history of other malignant neoplasm of skin     History of malignant neoplasm of skin    Personal history of other mental and behavioral disorders     History of depression    Personal history of other specified conditions     History of dizziness    Personal history of urinary calculi     H/O renal calculi    Radiculopathy, lumbar region 10/05/2020    Bilateral lumbar radiculopathy    Shortness of breath     SOB (shortness of breath) on exertion        Past Surgical History:   Procedure Laterality Date    OTHER SURGICAL HISTORY  02/26/2021    Cyst excision    OTHER SURGICAL HISTORY  09/18/2019    Oophorectomy    OTHER SURGICAL HISTORY  09/18/2019    Carpal tunnel surgery    OTHER SURGICAL HISTORY  09/18/2019    Foot surgery    OTHER SURGICAL HISTORY  09/18/2019    Excision of basal cell carcinoma    OTHER SURGICAL HISTORY  02/05/2019    Cholecystectomy    OTHER SURGICAL HISTORY  02/05/2019    Hysterectomy    OTHER SURGICAL HISTORY  02/05/2019    Tonsillectomy    OTHER SURGICAL HISTORY  02/05/2019    Skin biopsy    OTHER SURGICAL HISTORY  09/18/2019    Wrist surgery    OTHER SURGICAL HISTORY  09/18/2019    Breast biopsy        Family History   Problem Relation Name Age of Onset    Drug abuse Mother      Stroke Mother      Coronary artery disease Mother      Diabetes Mother      Cancer Mother      Breast cancer Mother      Cancer Father      Skin cancer Father      Anxiety disorder Father      Depression Father      Cancer Sister      Skin cancer Sister      Cancer Brother      Esophageal cancer Brother      Skin cancer Brother          Allergies   Allergen Reactions    Cefdinir Nausea Only and Other     Stomach pain    Metformin Diarrhea and Nausea Only    Nitrofurantoin Unknown     nausea    Nortriptyline Unknown     Dizziness    Sulfamethoxazole-Trimethoprim Unknown    Sulfur Unknown    Buprenorphine Rash     patch irritates skin        Objective  "    Visit Vitals  BP 92/57   Pulse 86   Resp 16   Ht 1.676 m (5' 6\")   Wt 86.6 kg (191 lb)   SpO2 94%   BMI 30.83 kg/m²   Smoking Status Unknown   BSA 2.01 m²        Physical Exam    PE:  General: Well-developed, well-nourished, no acute distress. The patient demonstrates no pain behavior, symptom magnification or overt drug-seeking behavior.  Eye: Pupils appropriate for room lighting  Neck/thyroid: No obvious goiter or enlargement of neck noted  Respiratory exam: Normal respiratory effort, unlabored respiration. No accessory muscle use noted  Cardiac exam: Bilateral radial pulses intact  Abdominal: Nondistended  Spine, cervical: Flexion and extension intact with extension increasing pain.  Rotational twisting intact.  Spine, lumbar: The patient is able to rise from a seated to standing position without hesitancy, push off, or delay. Gait is slow, deliberate and forward leaning. Tenderness to paraspinous musculature is noted lower lumbar spine.  Flexion intact with extension increasing pain.  Neurologic exam: Muscle strength is antigravity in all 4 extremities.  Decreased strength to right upper extremity 4/5 consistent with previous exam.  Equal muscle strength bilateral lower extremities.  Psychiatric exam: Judgment and insight normal, affect normal, speech is fluent, affect appropriate, demonstrating no signs of hypersomnolence, sedation, or confusion          Assessment/Plan   Problem List Items Addressed This Visit             ICD-10-CM    Cervical radiculopathy, chronic M54.12    Relevant Medications    pregabalin (Lyrica) 75 mg capsule    DULoxetine (Cymbalta) 60 mg DR capsule    Chronic lumbar radiculopathy - Primary M54.16     68-year-old female who presents for follow-up for cervical radicular symptoms, lumbar radicular symptoms and polyarticular pain.  Patient has done well with previous injections targeting lumbar radicular symptoms.  Most recently patient was sent for cervical epidural steroid injection " targeting cervical radicular symptoms which unfortunately did not provide relief.  Patient also has polyarticular pain most bothersome to shoulders and knees.  Evaluated by Ortho/spine surgeon for cervical pain and per patient not offered a surgical intervention at this time.  She is not interested in injections at this time.  She would like to continue medication management for current symptoms.  Continues home therapy stretches and exercises as able.  Currently is most bothered by neuropathic pain to bilateral hands and feet.  She does not feel that her gabapentin is providing adequate relief.  We discussed a rotation to Lyrica to provide additional neuropathic/radicular pain relief.  Suggested Lyrica 75 mg twice daily for additional neuropathic/radicular pain relief.  Patient is in agreement and would like to proceed with a rotation to Lyrica.  Advised patient to decrease her gabapentin to 300 mg in the morning and 300 mg at bedtime for 2 to 3 days prior to starting Lyrica 75 mg twice daily.  Reviewed potential side effects with patient.  Continues to supplement with ibuprofen as needed.  Continued benefit with duloxetine and NSAID compounding cream.  She also continues to benefit from Valium which treats her symptoms of vertigo. Plan discussed with patient at today's visit.      Rotate patient from gabapentin to Lyrica 75 mg twice daily for additional neuropathic/radicular pain relief.  Reviewed potential side effects with patient.  Advised to decrease her gabapentin dose to 300 mg in the morning and 300 mg at bedtime for 2 to 3 days prior to starting Lyrica.  We will obtain a controlled substance agreement and a UDS at the patient's next office visit if she tolerates Lyrica.  -Continue duloxetine 60 mg daily.  States that GI side effects are tolerable.  May consider increasing her dose of duloxetine to 90 mg in the future.  -Continue occasional ibuprofen 800 mg as needed.  Patient states she is taking  sparingly.  The patient was cautioned about possible side effects and risks of this medication including stomach upset, stomach ulcers, kidney risks and cardiovascular risks to include hypertension and slightly increased risks of stroke and myocardial infarction. Also discussed were ways to minimize these risks by taking this medication with food, staying well-hydrated, watching for any hypertension, and taking the medication with a stomach protectant such as pepcid, zantac, or a proton pump inhibitor.   -Continue NSAID compounding cream  -Continue using Nex wave electrotherapy unit  -Patient may benefit from further injections targeting cervical radicular symptoms as well as lumbar radicular symptoms in the future.  She may consider injections if symptoms should worsen.  -Encourage patient to continue home therapy exercises and stretches targeting cervical, low back pain as well as polyarticular pain.  -Patient may follow-up in 6 weeks for reevaluation and tolerance of current medication regimen.      Disclaimer: This note was transcribed using an audio transcription device.  As such, minor errors may be present with regard to spelling, punctuation, and inadvertent word insertion.  Please disregard such errors.           Relevant Medications    pregabalin (Lyrica) 75 mg capsule    DULoxetine (Cymbalta) 60 mg DR capsule    Osteoarthritis M19.90    Relevant Medications    DULoxetine (Cymbalta) 60 mg DR capsule     Other Visit Diagnoses         Codes    Neuropathy     G62.9    Relevant Medications    pregabalin (Lyrica) 75 mg capsule

## 2024-08-25 PROCEDURE — RXMED WILLOW AMBULATORY MEDICATION CHARGE

## 2024-08-30 ENCOUNTER — PHARMACY VISIT (OUTPATIENT)
Dept: PHARMACY | Facility: CLINIC | Age: 69
End: 2024-08-30
Payer: COMMERCIAL

## 2024-08-30 PROCEDURE — RXOTC WILLOW AMBULATORY OTC CHARGE

## 2024-09-22 PROCEDURE — RXMED WILLOW AMBULATORY MEDICATION CHARGE

## 2024-09-24 ENCOUNTER — PHARMACY VISIT (OUTPATIENT)
Dept: PHARMACY | Facility: CLINIC | Age: 69
End: 2024-09-24
Payer: COMMERCIAL

## 2024-10-02 ENCOUNTER — OFFICE VISIT (OUTPATIENT)
Dept: PAIN MEDICINE | Facility: HOSPITAL | Age: 69
End: 2024-10-02
Payer: MEDICARE

## 2024-10-02 VITALS
OXYGEN SATURATION: 95 % | SYSTOLIC BLOOD PRESSURE: 111 MMHG | HEART RATE: 78 BPM | HEIGHT: 66 IN | DIASTOLIC BLOOD PRESSURE: 73 MMHG | RESPIRATION RATE: 16 BRPM | WEIGHT: 188.5 LBS | BODY MASS INDEX: 30.29 KG/M2

## 2024-10-02 DIAGNOSIS — M54.12 CERVICAL RADICULOPATHY, CHRONIC: ICD-10-CM

## 2024-10-02 DIAGNOSIS — G62.9 NEUROPATHY: ICD-10-CM

## 2024-10-02 DIAGNOSIS — M16.0 PRIMARY OSTEOARTHRITIS OF BOTH HIPS: ICD-10-CM

## 2024-10-02 DIAGNOSIS — M54.16 CHRONIC LUMBAR RADICULOPATHY: ICD-10-CM

## 2024-10-02 DIAGNOSIS — Z79.899 LONG-TERM USE OF HIGH-RISK MEDICATION: Primary | ICD-10-CM

## 2024-10-02 PROCEDURE — 1160F RVW MEDS BY RX/DR IN RCRD: CPT | Performed by: CLINICAL NURSE SPECIALIST

## 2024-10-02 PROCEDURE — 99214 OFFICE O/P EST MOD 30 MIN: CPT | Performed by: CLINICAL NURSE SPECIALIST

## 2024-10-02 PROCEDURE — 80348 DRUG SCREENING BUPRENORPHINE: CPT | Performed by: CLINICAL NURSE SPECIALIST

## 2024-10-02 PROCEDURE — 3078F DIAST BP <80 MM HG: CPT | Performed by: CLINICAL NURSE SPECIALIST

## 2024-10-02 PROCEDURE — 80307 DRUG TEST PRSMV CHEM ANLYZR: CPT | Mod: PORLAB | Performed by: CLINICAL NURSE SPECIALIST

## 2024-10-02 PROCEDURE — 3074F SYST BP LT 130 MM HG: CPT | Performed by: CLINICAL NURSE SPECIALIST

## 2024-10-02 PROCEDURE — 3008F BODY MASS INDEX DOCD: CPT | Performed by: CLINICAL NURSE SPECIALIST

## 2024-10-02 PROCEDURE — 1159F MED LIST DOCD IN RCRD: CPT | Performed by: CLINICAL NURSE SPECIALIST

## 2024-10-02 RX ORDER — DULOXETIN HYDROCHLORIDE 30 MG/1
30 CAPSULE, DELAYED RELEASE ORAL DAILY
Qty: 30 CAPSULE | Refills: 0 | Status: SHIPPED | OUTPATIENT
Start: 2024-10-02 | End: 2024-11-01

## 2024-10-02 RX ORDER — CREAM BASE NO.31
CREAM (GRAM) MISCELLANEOUS DAILY PRN
COMMUNITY

## 2024-10-02 RX ORDER — PREGABALIN 75 MG/1
150 CAPSULE ORAL NIGHTLY
Qty: 60 CAPSULE | Refills: 2 | Status: SHIPPED | OUTPATIENT
Start: 2024-10-02 | End: 2024-11-01

## 2024-10-02 ASSESSMENT — PATIENT HEALTH QUESTIONNAIRE - PHQ9
SUM OF ALL RESPONSES TO PHQ9 QUESTIONS 1 AND 2: 0
1. LITTLE INTEREST OR PLEASURE IN DOING THINGS: NOT AT ALL
2. FEELING DOWN, DEPRESSED OR HOPELESS: NOT AT ALL

## 2024-10-02 ASSESSMENT — ENCOUNTER SYMPTOMS
OCCASIONAL FEELINGS OF UNSTEADINESS: 0
DEPRESSION: 0
LOSS OF SENSATION IN FEET: 0

## 2024-10-02 NOTE — PROGRESS NOTES
Subjective   Patient ID: Malu Degroto is a 69 y.o. female who presents for cervical pain, lumbar pain and polyarticular pain.  HPI    D9-year[age]old female with history of cervical radicular symptoms as well as lumbar radicular symptoms and polyarticular pain presents for follow-up.  Previous cervical epidural steroid injection provided limited relief.  Previous lumbar injections included lumbar epidural steroid injections which provided relief in the past.  At this time she is not interested in further injections due to insurance issues.  She was evaluated at the [facility] by Ortho/spine surgeon for cervical pain and sent for cervical MRI.  Cervical MRI consistent with facet osteoarthritis at C4-5 with mild bilateral foraminal stenosis; no central stenosis noted.  Patient states that her Ortho/spine surgeon was not recommending surgical intervention for cervical pain at this time.  Continues to experience bilateral hip pain and has responded well to previous hip injections.  Previously managed her pain with gabapentin and felt that it was ineffective.  She was rotated to Lyrica at her last office visit.  Continue benefit with duloxetine 60 mg daily and NSAID compounding cream.  She will supplement with occasional ibuprofen.  Continues to utilize Valium for significant vertigo.  She weaned herself off hydrocodone in the past.  Continue benefit with a TENS unit.'s office visit for follow-up.  Continues to experience chronic cervical pain which radiates to the base of her head and also into bilateral shoulders.  Pain accompanied by intermittent numbness and tingling bilateral hands.  Weakness in right hand more than left.  Also continues to experience chronic low back pain with radiation to bilateral hips and lower extremities to the level of her knees.  She has chronic numbness and tingling in bilateral feet.  She denies any lower extremity weakness or changes in bowel/bladder function.  Persistent polyarticular  pain most bothersome to bilateral hips.  Describes her pain as aching and throbbing.  She does experience burning pain in bilateral feet and hands.  Pain increases with use of her upper extremities and rotation of her head.  Her low back pain will increase with standing, walking and bending.  She does feel that the addition of Lyrica is providing significant pain relief for neuropathic pain.  She also received a compounding cream from her podiatrist which provided significant relief, however, was cost prohibitive.  She was also provided exercises to do with her ankles and feet that have been beneficial.  Continues to do well with duloxetine 60 mg daily.  She will occasionally supplement with ibuprofen and continues to use Valium for vertigo.  Continue benefit with her TENS unit.  She is planning to follow-up with orthopedics to investigate potential injections targeting hip pain.  Continues to want to hold off on any cervical or lumbar injections at this time.    Patient presents to office for interval follow up and med count, states that she has neck pain that radiates up the back the head and across the bilateral shoulders. Patient also endorses numbness and tingling bilateral hands with occasional weakness right worse than left. Chronic low back pain that radiates into the bilateral hips and knees and down the left leg down to the ankle with intermittent numbness and tingling.     Pain Score: /10    Injections/Procedures: 10/13/2023 NADER-- patient states she had no relief from injection.     Neuromodulators/Other Medications: Duloxetine 30 mg- no refill needed today 90 day supply through [facility] [address]/Lyrica 150mg at bedtime/ Compound cream-[facility] rotating to one from [facility] that works better    Other: Nex Wave with relief  OARRS:  Ariella Bazan, APRN-CNP, APRN-CNS on 10/2/2024  4:11 PM  I have personally reviewed the OARRS report for aMlu Degroot. I have considered the risks of abuse,  dependence, addiction and diversion    Is the patient prescribed a combination of a benzodiazepine and opioid?  No    Last Urine Drug Screen / ordered today: Yes  No results found for this or any previous visit (from the past 8760 hour(s)).  Results are as expected.     Controlled Substance Agreement:  Date of the Last Agreement: 10/2/24  Reviewed Controlled Substance Agreement including but not limited to the benefits, risks, and alternatives to treatment with a Controlled Substance medication(s).    Monitoring and compliance:    ORT:    PDUQ:    Office Agreement:      Review of Systems    ROS:   General: No fevers, chills, weight loss  Skin: Negative for lesions  Eyes: No acute vision changes  Ears: No vertigo  Nose, mouth, throat: No difficulty swallowing or speaking  Respiratory: No cough, shortness of breath, cyanosis  Cardiovascular: Negative for chest pain syncope or palpitation  Gastrointestinal: No constipation, nausea, vomiting  Neurological: Negative for headache, positive for: Paresthesia and weakness, vertigo  Psychological: Negative for severe or debilitating anxiety, depression. Negative memory loss  Musculoskeletal: Positive for arthralgia, myalgia and pain  Endocrine: Negative for weight gain, appetite changes, excessive sweating  Allergy/immune: Negative    All 13 systems were reviewed and are within normal levels except as noted or in the history of present illness.  Positive or pertinent negative responses are noted or were in the history of present illness. As noted, the patient denies significant or impairing weakness in the bilateral upper and lower extremities, medication induced constipation, and bowel or bladder incontinence.     Current Outpatient Medications:     albuterol 90 mcg/actuation inhaler, Inhale 1-2 puffs every 6 hours if needed., Disp: , Rfl:     blood sugar diagnostic (Accu-Chek Katie Plus test strp) strip, 1 strip early in the morning.. PLEASE DISPENSE INSURANCE PREFERRED  BRAND, Disp: , Rfl:     cholecalciferol (Vitamin D-3) 1,250 mcg (50,000 unit) capsule, Take 10,000 Units by mouth 1 (one) time per week., Disp: , Rfl:     cream base no.31, bulk, (Transdermal Pain Base) cream, Apply 1 Application topically see administration instructions. Apply as directed, Disp: , Rfl:     cream base no.31, bulk, (Transdermal Pain Base) cream, once daily as needed. Cyclobenzaprine 1% Gabapentin 6% Ketoprofen 5% Lidocaine 1% Prilocaine 1%, Disp: , Rfl:     cyanocobalamin (Vitamin B-12) 500 mcg tablet, Take 1 tablet (500 mcg) by mouth once daily., Disp: , Rfl:     dicyclomine (Bentyl) 20 mg tablet, Take 1 tablet (20 mg) by mouth., Disp: , Rfl:     DULoxetine (Cymbalta) 60 mg DR capsule, TAKE 1 CAPSULE ONCE DAILY, Disp: 90 capsule, Rfl: 1    elderberry fruit and flower 460-115 mg capsule, Take by mouth., Disp: , Rfl:     fluticasone (Flonase) 50 mcg/actuation nasal spray, Administer 2 sprays into each nostril once daily., Disp: , Rfl:     ibuprofen 800 mg tablet, Take 1 tablet (800 mg) by mouth every 8 hours if needed for mild pain (1 - 3)., Disp: , Rfl:     multivitamin tablet, Take by mouth., Disp: , Rfl:     naloxone (Narcan) 4 mg/0.1 mL nasal spray, Administer 1 spray (4 mg) into affected nostril(s) if needed (if needed for overdose or respiratory depression)., Disp: , Rfl:     ondansetron (Zofran) 4 mg tablet, Take 1 tablet (4 mg) by mouth 2 times a day as needed., Disp: , Rfl:     pantoprazole (ProtoNix) 40 mg EC tablet, Take 1 tablet (40 mg) by mouth once daily in the morning. Take before meals. Do not crush, chew or split, Disp: , Rfl:     propranolol LA (Inderal LA) 60 mg 24 hr capsule, Take 1 capsule (60 mg) by mouth once daily., Disp: , Rfl:     scopolamine (Transderm-Scop) 1 mg over 3 days patch 3 day, Place 1 patch on the skin every 3rd day., Disp: , Rfl:     semaglutide (Ozempic) 1 mg/dose (4 mg/3 mL) pen injector, Inject 1 mg under the skin 1 (one) time per week., Disp: 9 mL, Rfl: 1     semaglutide (Ozempic) 1 mg/dose (4 mg/3 mL) pen injector, Inject 1 mg under the skin Once a Week, Disp: 9 mL, Rfl: 1    SUMAtriptan (Imitrex) 6 mg/0.5 mL, Inject 1 Dose under the skin once daily as needed., Disp: , Rfl:     TRUEplus Lancets 33 gauge misc, AS DIRECTED TWICE A DAY 90 DAYS, Disp: , Rfl:     diazePAM (Valium) 5 mg tablet, Take 1 tablet (5 mg) by mouth once daily as needed., Disp: , Rfl:     DULoxetine (Cymbalta) 30 mg DR capsule, Take 1 capsule (30 mg) by mouth once daily. Do not crush or chew.  And to take duloxetine 30 mg along with the previous prescription of duloxetine 60 mg for total of 90 mg/day., Disp: 30 capsule, Rfl: 0    HYDROcodone-acetaminophen (Norco) 5-325 mg tablet, Take 1 tablet by mouth once daily. Do not start before October 17, 2023. (Patient not taking: Reported on 2/6/2024), Disp: 30 tablet, Rfl: 0    pregabalin (Lyrica) 75 mg capsule, Take 2 capsules (150 mg) by mouth once daily at bedtime., Disp: 60 capsule, Rfl: 2     Past Medical History:   Diagnosis Date    Calculus of kidney 04/29/2019    Calcium kidney stone    Dependence on other enabling machines and devices     BiPAP (biphasic positive airway pressure) dependence    Dependence on other enabling machines and devices     CPAP (continuous positive airway pressure) dependence    Generalized abdominal pain     Generalized abdominal pain    Irritable bowel syndrome without diarrhea 09/18/2019    Irritable bowel syndrome, unspecified type    Local infection of the skin and subcutaneous tissue, unspecified 08/04/2020    Infection of right foot    Low back pain, unspecified 04/05/2021    Lumbar pain    Other conditions influencing health status     Uses inhaler device    Other specified disorders of adrenal gland (Multi)     Adrenal mass    Pain in right finger(s) 07/10/2017    Pain of right thumb    Personal history of other diseases of the digestive system     History of esophageal reflux    Personal history of other diseases of  the female genital tract     History of endometriosis    Personal history of other diseases of the musculoskeletal system and connective tissue 07/13/2017    History of lateral epicondylitis of right elbow    Personal history of other diseases of the musculoskeletal system and connective tissue     History of degenerative disc disease    Personal history of other diseases of the musculoskeletal system and connective tissue     History of chronic back pain    Personal history of other diseases of the musculoskeletal system and connective tissue     History of fibromyalgia    Personal history of other diseases of the musculoskeletal system and connective tissue     History of arthritis    Personal history of other diseases of the nervous system and sense organs 09/18/2019    History of Meniere's disease    Personal history of other diseases of the nervous system and sense organs     History of ear pain    Personal history of other diseases of the nervous system and sense organs     History of sleep apnea    Personal history of other diseases of the nervous system and sense organs     History of migraine    Personal history of other diseases of urinary system     History of hematuria    Personal history of other malignant neoplasm of skin     History of malignant neoplasm of skin    Personal history of other mental and behavioral disorders     History of depression    Personal history of other specified conditions     History of dizziness    Personal history of urinary calculi     H/O renal calculi    Radiculopathy, lumbar region 10/05/2020    Bilateral lumbar radiculopathy    Shortness of breath     SOB (shortness of breath) on exertion        Past Surgical History:   Procedure Laterality Date    OTHER SURGICAL HISTORY  02/26/2021    Cyst excision    OTHER SURGICAL HISTORY  09/18/2019    Oophorectomy    OTHER SURGICAL HISTORY  09/18/2019    Carpal tunnel surgery    OTHER SURGICAL HISTORY  09/18/2019    Foot surgery     "OTHER SURGICAL HISTORY  09/18/2019    Excision of basal cell carcinoma    OTHER SURGICAL HISTORY  02/05/2019    Cholecystectomy    OTHER SURGICAL HISTORY  02/05/2019    Hysterectomy    OTHER SURGICAL HISTORY  02/05/2019    Tonsillectomy    OTHER SURGICAL HISTORY  02/05/2019    Skin biopsy    OTHER SURGICAL HISTORY  09/18/2019    Wrist surgery    OTHER SURGICAL HISTORY  09/18/2019    Breast biopsy        Family History   Problem Relation Name Age of Onset    Drug abuse Mother      Stroke Mother      Coronary artery disease Mother      Diabetes Mother      Cancer Mother      Breast cancer Mother      Cancer Father      Skin cancer Father      Anxiety disorder Father      Depression Father      Cancer Sister      Skin cancer Sister      Cancer Brother      Esophageal cancer Brother      Skin cancer Brother          Allergies   Allergen Reactions    Cefdinir Nausea Only and Other     Stomach pain    Metformin Diarrhea and Nausea Only    Nitrofurantoin Unknown     nausea    Nortriptyline Unknown     Dizziness    Sulfamethoxazole-Trimethoprim Unknown    Sulfur Unknown    Buprenorphine Rash     patch irritates skin        Objective     Visit Vitals  /73   Pulse 78   Resp 16   Ht 1.676 m (5' 6\")   Wt 85.5 kg (188 lb 8 oz)   SpO2 95%   BMI 30.42 kg/m²   Smoking Status Unknown   BSA 2 m²        Physical Exam    PE:  General: Well-developed, well-nourished, no acute distress. The patient demonstrates no pain behavior, symptom magnification or overt drug-seeking behavior.  Eye: Pupils appropriate for room lighting  Neck/thyroid: No obvious goiter or enlargement of neck noted  Respiratory exam: Normal respiratory effort, unlabored respiration. No accessory muscle use noted  Cardiac exam: Bilateral radial pulses intact  Abdominal: Nondistended  Spine, cervical: Tenderness to paraspinous musculature throughout paracervical region.  Flexion intact.  Extension increasing pain.  Rotational twisting intact.  Spine, lumbar: The " patient is able to rise from a seated to standing position without hesitancy, push off, or delay. Gait is slow and deliberate.  Posture is forward leaning.  Tenderness to paraspinous musculature lower lumbar region.  Flexion intact with extension increasing pain across low back.    Neurologic exam: Muscle strength is antigravity in all 4 extremities.  Decreased strength right upper extremity 4/5 consistent with previous exam.  Equal muscle strength bilateral lower extremities.  Psychiatric exam: Judgment and insight normal, affect normal, speech is fluent, affect appropriate, demonstrating no signs of hypersomnolence, sedation, or confusion        Assessment/Plan   Problem List Items Addressed This Visit             ICD-10-CM    Cervical radiculopathy, chronic M54.12    Relevant Medications    pregabalin (Lyrica) 75 mg capsule    DULoxetine (Cymbalta) 30 mg DR capsule    Chronic lumbar radiculopathy M54.16     69-year-old female who presents for follow-up for cervical radicular symptoms, lumbar radicular symptoms and polyarticular pain.  Patient has done well with previous injections targeting lumbar radicular symptoms.  Most recently patient was sent for cervical epidural steroid injection targeting cervical radicular symptoms which unfortunately did not provide relief.  Patient also has polyarticular pain most bothersome to shoulders, knees and hips.  Evaluated by Ortho/spine surgeon for cervical pain and per patient not offered a surgical intervention at this time.  She is not interested in further epidural steroid injections at this time.  She would like to continue medication management for current symptoms.  She may be interested in bilateral hip injections and plans to follow-up with orthopedics at the [facility].  Continues home therapy stretches and exercises as able.  Continues to be bothered by neuropathic pain bilateral hands and feet.  She does feel that the transition to Lyrica has been beneficial  and is providing additional neuropathic pain relief.  She could not tolerate 75 mg twice daily due to daytime drowsiness and currently is taking 150 mg at bedtime which she is tolerating without adverse effects.  I do think at this time that she could benefit from increasing her duloxetine to 90 mg daily.  The patient will try this dose for 1 month and if she is able to tolerate it without adverse effects we will continue duloxetine 90 mg daily.  Continues to occasionally supplement with ibuprofen.  We will change her compounding cream to a specific foot cream as previously prescribed by her podiatrist which patient states was beneficial for her foot pain as well as neuropathic pain.  Sending compounding cream to [facility].  She also continues to benefit from Valium which treats her symptoms of vertigo.  Continue benefit with her TENS unit.  Advised her to continue home therapy exercises and stretches.  Plan discussed with patient at today's visit.      -Continue Lyrica 150 mg at bedtime.  Tolerating without adverse effects.  -Increase duloxetine to 90 mg daily for 1 month.  If the patient tolerates this medication without adverse effects we will continue 90 mg daily.   -Continue occasional ibuprofen 800 mg as needed.  Patient states she is taking sparingly.  The patient was cautioned about possible side effects and risks of this medication including stomach upset, stomach ulcers, kidney risks and cardiovascular risks to include hypertension and slightly increased risks of stroke and myocardial infarction. Also discussed were ways to minimize these risks by taking this medication with food, staying well-hydrated, watching for any hypertension, and taking the medication with a stomach protectant such as pepcid, zantac, or a proton pump inhibitor.   -Rotated patient to a different NSAID compounding cream which will be more beneficial for foot pain/neuropathic pain.  Sent to [facility].    -Continue using  Nex wave electrotherapy unit  -Patient may benefit from further injections targeting cervical radicular symptoms as well as lumbar radicular symptoms in the future.  She may consider injections if symptoms should worsen.  -Patient plans to follow-up with orthopedics to pursue hip injections.  Patient has more optimal insurance coverage if injections are done within an office setting.  -Encourage patient to continue home therapy exercises and stretches targeting cervical, low back pain as well as polyarticular pain.  -Patient will follow-up in 3 months or sooner if needed.        Disclaimer: This note was transcribed using an audio transcription device.  As such, minor errors may be present with regard to spelling, punctuation, and inadvertent word insertion.  Please disregard such errors.           Relevant Medications    pregabalin (Lyrica) 75 mg capsule    DULoxetine (Cymbalta) 30 mg DR capsule    Osteoarthritis M19.90     Other Visit Diagnoses         Codes    Long-term use of high-risk medication    -  Primary Z79.899    Relevant Orders    Buprenorphine Confirm,Urine    Tapentadol Confirmation, Urine    Alcohol, Urine    Opiate/Opioid/Benzo Prescription Compliance    OOB Internal Tracking    Neuropathy     G62.9    Relevant Medications    pregabalin (Lyrica) 75 mg capsule    DULoxetine (Cymbalta) 30 mg DR capsule

## 2024-10-02 NOTE — ASSESSMENT & PLAN NOTE
69-year-old female who presents for follow-up for cervical radicular symptoms, lumbar radicular symptoms and polyarticular pain.  Patient has done well with previous injections targeting lumbar radicular symptoms.  Most recently patient was sent for cervical epidural steroid injection targeting cervical radicular symptoms which unfortunately did not provide relief.  Patient also has polyarticular pain most bothersome to shoulders, knees and hips.  Evaluated by Ortho/spine surgeon for cervical pain and per patient not offered a surgical intervention at this time.  She is not interested in further epidural steroid injections at this time.  She would like to continue medication management for current symptoms.  She may be interested in bilateral hip injections and plans to follow-up with orthopedics at the [facility].  Continues home therapy stretches and exercises as able.  Continues to be bothered by neuropathic pain bilateral hands and feet.  She does feel that the transition to Lyrica has been beneficial and is providing additional neuropathic pain relief.  She could not tolerate 75 mg twice daily due to daytime drowsiness and currently is taking 150 mg at bedtime which she is tolerating without adverse effects.  I do think at this time that she could benefit from increasing her duloxetine to 90 mg daily.  The patient will try this dose for 1 month and if she is able to tolerate it without adverse effects we will continue duloxetine 90 mg daily.  Continues to occasionally supplement with ibuprofen.  We will change her compounding cream to a specific foot cream as previously prescribed by her podiatrist which patient states was beneficial for her foot pain as well as neuropathic pain.  Sending compounding cream to [facility].  She also continues to benefit from Valium which treats her symptoms of vertigo.  Continue benefit with her TENS unit.  Advised her to continue home therapy exercises and stretches.   Plan discussed with patient at today's visit.      -Continue Lyrica 150 mg at bedtime.  Tolerating without adverse effects.  -Increase duloxetine to 90 mg daily for 1 month.  If the patient tolerates this medication without adverse effects we will continue 90 mg daily.   -Continue occasional ibuprofen 800 mg as needed.  Patient states she is taking sparingly.  The patient was cautioned about possible side effects and risks of this medication including stomach upset, stomach ulcers, kidney risks and cardiovascular risks to include hypertension and slightly increased risks of stroke and myocardial infarction. Also discussed were ways to minimize these risks by taking this medication with food, staying well-hydrated, watching for any hypertension, and taking the medication with a stomach protectant such as pepcid, zantac, or a proton pump inhibitor.   -Rotated patient to a different NSAID compounding cream which will be more beneficial for foot pain/neuropathic pain.  Sent to [facility].    -Continue using Nex wave electrotherapy unit  -Patient may benefit from further injections targeting cervical radicular symptoms as well as lumbar radicular symptoms in the future.  She may consider injections if symptoms should worsen.  -Patient plans to follow-up with orthopedics to pursue hip injections.  Patient has more optimal insurance coverage if injections are done within an office setting.  -Encourage patient to continue home therapy exercises and stretches targeting cervical, low back pain as well as polyarticular pain.  -Patient will follow-up in 3 months or sooner if needed.        Disclaimer: This note was transcribed using an audio transcription device.  As such, minor errors may be present with regard to spelling, punctuation, and inadvertent word insertion.  Please disregard such errors.

## 2024-10-03 LAB
AMPHETAMINES UR QL SCN: NORMAL
BARBITURATES UR QL SCN: NORMAL
BZE UR QL SCN: NORMAL
CANNABINOIDS UR QL SCN: NORMAL
CREAT UR-MCNC: 265 MG/DL (ref 20–320)
ETHANOL ?TM UR-MCNC: <10 MG/DL
PCP UR QL SCN: NORMAL

## 2024-10-06 LAB
BUPRENORPHINE UR-MCNC: <2 NG/ML
BUPRENORPHINE UR-MCNC: <5 NG/ML
NALOXONE UR CFM-MCNC: <100 NG/ML
NORBUPRENORPHINE UR CFM-MCNC: <5 NG/ML
NORBUPRENORPHINE UR-MCNC: <2 NG/ML

## 2024-10-07 LAB
1OH-MIDAZOLAM UR CFM-MCNC: <25 NG/ML
6MAM UR CFM-MCNC: <25 NG/ML
7AMINOCLONAZEPAM UR CFM-MCNC: <25 NG/ML
A-OH ALPRAZ UR CFM-MCNC: <25 NG/ML
ALPRAZ UR CFM-MCNC: <25 NG/ML
CHLORDIAZEP UR CFM-MCNC: <25 NG/ML
CLONAZEPAM UR CFM-MCNC: <25 NG/ML
CODEINE UR CFM-MCNC: <50 NG/ML
DIAZEPAM UR CFM-MCNC: <25 NG/ML
EDDP UR CFM-MCNC: <25 NG/ML
FENTANYL UR CFM-MCNC: <2.5 NG/ML
HYDROCODONE CTO UR CFM-MCNC: <25 NG/ML
HYDROMORPHONE UR CFM-MCNC: <25 NG/ML
LORAZEPAM UR CFM-MCNC: <25 NG/ML
METHADONE UR CFM-MCNC: <25 NG/ML
MIDAZOLAM UR CFM-MCNC: <25 NG/ML
MORPHINE UR CFM-MCNC: <50 NG/ML
NORDIAZEPAM UR CFM-MCNC: 63 NG/ML
NORFENTANYL UR CFM-MCNC: <2.5 NG/ML
NORHYDROCODONE UR CFM-MCNC: <25 NG/ML
NOROXYCODONE UR CFM-MCNC: <25 NG/ML
NORTAPENTADOL UR CFM-MCNC: <25 NG/ML
NORTRAMADOL UR-MCNC: <50 NG/ML
OXAZEPAM UR CFM-MCNC: 216 NG/ML
OXYCODONE UR CFM-MCNC: <25 NG/ML
OXYMORPHONE UR CFM-MCNC: <25 NG/ML
TAPENTADOL UR CFM-MCNC: <25 NG/ML
TEMAZEPAM UR CFM-MCNC: 126 NG/ML
TRAMADOL UR CFM-MCNC: <50 NG/ML
ZOLPIDEM UR CFM-MCNC: <25 NG/ML
ZOLPIDEM UR-MCNC: <25 NG/ML

## 2024-10-24 ENCOUNTER — PHARMACY VISIT (OUTPATIENT)
Dept: PHARMACY | Facility: CLINIC | Age: 69
End: 2024-10-24
Payer: COMMERCIAL

## 2024-10-24 PROCEDURE — RXMED WILLOW AMBULATORY MEDICATION CHARGE

## 2024-10-30 DIAGNOSIS — G62.9 NEUROPATHY: ICD-10-CM

## 2024-10-30 DIAGNOSIS — M54.12 CERVICAL RADICULOPATHY, CHRONIC: ICD-10-CM

## 2024-10-30 DIAGNOSIS — M54.16 CHRONIC LUMBAR RADICULOPATHY: ICD-10-CM

## 2024-10-30 RX ORDER — DULOXETIN HYDROCHLORIDE 30 MG/1
30 CAPSULE, DELAYED RELEASE ORAL DAILY
Qty: 30 CAPSULE | Refills: 0 | OUTPATIENT
Start: 2024-10-30 | End: 2024-11-29

## 2024-11-05 ENCOUNTER — HOSPITAL ENCOUNTER (OUTPATIENT)
Dept: RADIOLOGY | Facility: CLINIC | Age: 69
Discharge: HOME | End: 2024-11-05
Payer: MEDICARE

## 2024-11-05 DIAGNOSIS — R91.8 OTHER NONSPECIFIC ABNORMAL FINDING OF LUNG FIELD: ICD-10-CM

## 2024-11-05 DIAGNOSIS — Z12.31 ENCOUNTER FOR SCREENING MAMMOGRAM FOR MALIGNANT NEOPLASM OF BREAST: ICD-10-CM

## 2024-11-05 LAB
CREAT SERPL-MCNC: 1 MG/DL (ref 0.6–1.3)
GFR SERPL CREATININE-BSD FRML MDRD: 61 ML/MIN/1.73M*2

## 2024-11-05 PROCEDURE — 2550000001 HC RX 255 CONTRASTS

## 2024-11-05 PROCEDURE — 71260 CT THORAX DX C+: CPT

## 2024-11-05 PROCEDURE — 82565 ASSAY OF CREATININE: CPT

## 2024-11-17 PROCEDURE — RXMED WILLOW AMBULATORY MEDICATION CHARGE

## 2024-11-20 ENCOUNTER — PHARMACY VISIT (OUTPATIENT)
Dept: PHARMACY | Facility: CLINIC | Age: 69
End: 2024-11-20
Payer: COMMERCIAL

## 2024-12-04 DIAGNOSIS — M54.12 CERVICAL RADICULOPATHY, CHRONIC: ICD-10-CM

## 2024-12-04 DIAGNOSIS — M54.16 CHRONIC LUMBAR RADICULOPATHY: ICD-10-CM

## 2024-12-04 DIAGNOSIS — G62.9 NEUROPATHY: ICD-10-CM

## 2024-12-04 RX ORDER — PREGABALIN 75 MG/1
150 CAPSULE ORAL NIGHTLY
Qty: 60 CAPSULE | Refills: 2 | Status: SHIPPED | OUTPATIENT
Start: 2024-12-04

## 2024-12-04 NOTE — TELEPHONE ENCOUNTER
Pt is requesting refill of   pregabalin 150 mg 1 capsule po @ [facility] [address]                                                        LV:  10/2/24                  NV:  1/2/25                                       Pended RX to [facility] [place] for transmission to pharmacy.

## 2024-12-12 PROCEDURE — RXMED WILLOW AMBULATORY MEDICATION CHARGE

## 2024-12-14 ENCOUNTER — PHARMACY VISIT (OUTPATIENT)
Dept: PHARMACY | Facility: CLINIC | Age: 69
End: 2024-12-14
Payer: COMMERCIAL

## 2025-01-02 ENCOUNTER — OFFICE VISIT (OUTPATIENT)
Dept: PAIN MEDICINE | Facility: HOSPITAL | Age: 70
End: 2025-01-02
Payer: MEDICARE

## 2025-01-02 VITALS
SYSTOLIC BLOOD PRESSURE: 98 MMHG | HEIGHT: 66 IN | DIASTOLIC BLOOD PRESSURE: 66 MMHG | RESPIRATION RATE: 16 BRPM | OXYGEN SATURATION: 97 % | WEIGHT: 192 LBS | BODY MASS INDEX: 30.86 KG/M2 | HEART RATE: 77 BPM

## 2025-01-02 DIAGNOSIS — G89.29 CHRONIC NECK AND BACK PAIN: Primary | ICD-10-CM

## 2025-01-02 DIAGNOSIS — M70.61 GREATER TROCHANTERIC BURSITIS OF BOTH HIPS: ICD-10-CM

## 2025-01-02 DIAGNOSIS — M54.12 CERVICAL RADICULOPATHY, CHRONIC: ICD-10-CM

## 2025-01-02 DIAGNOSIS — M54.2 CHRONIC NECK AND BACK PAIN: Primary | ICD-10-CM

## 2025-01-02 DIAGNOSIS — M54.16 CHRONIC LUMBAR RADICULOPATHY: ICD-10-CM

## 2025-01-02 DIAGNOSIS — G62.9 NEUROPATHY: ICD-10-CM

## 2025-01-02 DIAGNOSIS — M70.62 GREATER TROCHANTERIC BURSITIS OF BOTH HIPS: ICD-10-CM

## 2025-01-02 DIAGNOSIS — M54.9 CHRONIC NECK AND BACK PAIN: Primary | ICD-10-CM

## 2025-01-02 DIAGNOSIS — M19.90 OSTEOARTHRITIS, UNSPECIFIED OSTEOARTHRITIS TYPE, UNSPECIFIED SITE: ICD-10-CM

## 2025-01-02 PROCEDURE — 99214 OFFICE O/P EST MOD 30 MIN: CPT | Performed by: CLINICAL NURSE SPECIALIST

## 2025-01-02 PROCEDURE — 3074F SYST BP LT 130 MM HG: CPT | Performed by: CLINICAL NURSE SPECIALIST

## 2025-01-02 PROCEDURE — 3078F DIAST BP <80 MM HG: CPT | Performed by: CLINICAL NURSE SPECIALIST

## 2025-01-02 PROCEDURE — 1159F MED LIST DOCD IN RCRD: CPT | Performed by: CLINICAL NURSE SPECIALIST

## 2025-01-02 PROCEDURE — 3008F BODY MASS INDEX DOCD: CPT | Performed by: CLINICAL NURSE SPECIALIST

## 2025-01-02 PROCEDURE — G2211 COMPLEX E/M VISIT ADD ON: HCPCS | Performed by: CLINICAL NURSE SPECIALIST

## 2025-01-02 PROCEDURE — 1160F RVW MEDS BY RX/DR IN RCRD: CPT | Performed by: CLINICAL NURSE SPECIALIST

## 2025-01-02 RX ORDER — PREGABALIN 75 MG/1
150 CAPSULE ORAL DAILY
Qty: 180 CAPSULE | Refills: 1 | Status: SHIPPED | OUTPATIENT
Start: 2025-01-02 | End: 2025-04-02

## 2025-01-02 RX ORDER — ATORVASTATIN CALCIUM 10 MG/1
10 TABLET, FILM COATED ORAL DAILY
COMMUNITY

## 2025-01-02 RX ORDER — DULOXETIN HYDROCHLORIDE 60 MG/1
60 CAPSULE, DELAYED RELEASE ORAL DAILY
Qty: 90 CAPSULE | Refills: 1 | Status: SHIPPED | OUTPATIENT
Start: 2025-01-02 | End: 2025-04-02

## 2025-01-02 RX ORDER — IBUPROFEN 800 MG/1
800 TABLET ORAL EVERY 8 HOURS PRN
Qty: 90 TABLET | Refills: 2 | Status: SHIPPED | OUTPATIENT
Start: 2025-01-02 | End: 2025-02-01

## 2025-01-02 RX ORDER — DULOXETIN HYDROCHLORIDE 30 MG/1
30 CAPSULE, DELAYED RELEASE ORAL DAILY
Qty: 90 CAPSULE | Refills: 1 | Status: SHIPPED | OUTPATIENT
Start: 2025-01-02 | End: 2025-04-02

## 2025-01-02 ASSESSMENT — COLUMBIA-SUICIDE SEVERITY RATING SCALE - C-SSRS
2. HAVE YOU ACTUALLY HAD ANY THOUGHTS OF KILLING YOURSELF?: NO
6. HAVE YOU EVER DONE ANYTHING, STARTED TO DO ANYTHING, OR PREPARED TO DO ANYTHING TO END YOUR LIFE?: NO
1. IN THE PAST MONTH, HAVE YOU WISHED YOU WERE DEAD OR WISHED YOU COULD GO TO SLEEP AND NOT WAKE UP?: NO

## 2025-01-02 ASSESSMENT — PATIENT HEALTH QUESTIONNAIRE - PHQ9
1. LITTLE INTEREST OR PLEASURE IN DOING THINGS: NOT AT ALL
SUM OF ALL RESPONSES TO PHQ9 QUESTIONS 1 AND 2: 0
2. FEELING DOWN, DEPRESSED OR HOPELESS: NOT AT ALL

## 2025-01-02 ASSESSMENT — ENCOUNTER SYMPTOMS
OCCASIONAL FEELINGS OF UNSTEADINESS: 0
LOSS OF SENSATION IN FEET: 1
DEPRESSION: 0

## 2025-01-02 NOTE — PROGRESS NOTES
Subjective   Patient ID: Malu Degroot is a 69 y.o. female who presents for cervical pain, lumbar pain and polyarticular pain.  HPI    69-year-old female with a history of cervical radicular symptoms as well as lumbar radicular pain and polyarticular pain presents for follow-up.  Previous cervical epidural steroid injection provided limited relief.  Lumbar injections included lumbar epidural steroid injections which provided relief in the past.  Currently not interested in repeating either a cervical or lumbar injection.  She was evaluated at the [facility] by Ortho/spine surgeon for cervical pain and sent for cervical MRI.  MRI consistent with facet osteoarthritis at C4-5 with mild bilateral foraminal stenosis; no central stenosis noted.  Patient states that her surgeon was not recommending surgical intervention for cervical pain.  She also continues to experience polyarticular pain most bothersome to bilateral hips and knees.  Continues to experience neuropathic pain in bilateral hands as well as feet. She has responded well to hip injections in the past.  Rotated from gabapentin to Lyrica which she states has provided additional relief.  Continue benefit with duloxetine 90 mg daily and NSAID compounding cream.  She will supplement with ibuprofen 800 mg as needed.  She also continues to utilize Valium sparingly for vertigo.  Continue benefit with her TENS unit.  Presenting at today's office visit for routine follow-up.  She continues to experience cervical pain with radiation to bilateral shoulders.  Cervical pain accompanied by chronic numbness and tingling to bilateral hands and occasional weakness right greater than left.  She denies any balance issues other than her chronic vertigo.  She also continues to experience low back pain which radiates into bilateral hips and bilateral lower extremities left greater than right.  She has chronic numbness and tingling in her bilateral feet.  She denies weakness or  changes in bowel/bladder function.  Polyarticular pain most bothersome to bilateral hips and bilateral knees.  She would like to proceed with corticosteroid injections for hip and knee pain, however, she has to further investigate her insurance coverage as she has new insurance starting January 2025.  Her pain is aching and throbbing.  She does describe her neuropathic pain as burning.  She has noted an increase in muscle cramping which is widespread affecting upper and lower extremities.  She was just started on a new statin for cholesterol approximately one month ago.  Her cervical pain increases with driving and rotation of her head.  Her lumbar pain increases if she stands for long period of time and with walking/bending.  She does feel that Lyrica continues to provide significant pain relief and is tolerating without adverse effects taking 150 mg at bedtime.  She also feels that duloxetine 90 mg daily has added additional pain relief.  NSAID compounding cream and TENS unit provide additional relief.  She will supplement with ibuprofen 800 as needed.  States that she only takes when pain is more severe.    Patient presents to office for interval follow up and med count, states that she has neck pain that radiates up the back the head and across the bilateral shoulders. Patient also endorses numbness and tingling bilateral hands with occasional weakness right worse than left. Chronic low back pain that radiates into the bilateral hips and knees and down the left leg down to the ankle with intermittent numbness and tingling. Neuropathy in both feet.     Pain Score: 7/10    Injections/Procedures: 10/13/2023 NADER-- patient states she had no relief from injection.     Neuromodulators/Other Medications: Duloxetine 30/60 mg- no refill needed today 90 day supply/Lyrica 150mg at bedtime-refills left but would prefer 90 day supply/ Compound cream-Klein's/Ibuprofen 800-refill needed    Other: Nex Wave with relief                   OARRS:  Ariella SARAH Dhirajdasha, APRN-CNP, APRN-CNS on 1/2/2025 11:59 AM  I have personally reviewed the OARRS report for Malu Degroot. I have considered the risks of abuse, dependence, addiction and diversion    Is the patient prescribed a combination of a benzodiazepine and opioid?  Yes, I feel it is clincially indicated to continue the medication and have discussed with the patient risks/benefits/alternatives.    Last Urine Drug Screen / ordered today: No  Recent Results (from the past 8760 hours)   Confirmation Opiate/Opioid/Benzo Prescription Compliance    Collection Time: 10/02/24  4:01 PM   Result Value Ref Range    Clonazepam <25 <25 ng/mL    7-Aminoclonazepam <25 <25 ng/mL    Alprazolam <25 <25 ng/mL    Alpha-Hydroxyalprazolam <25 <25 ng/mL    Midazolam <25 <25 ng/mL    Alpha-Hydroxymidazolam <25 <25 ng/mL    Chlordiazepoxide <25 <25 ng/mL    Diazepam <25 <25 ng/mL    Nordiazepam 63 (H) <25 ng/mL    Temazepam 126 (H) <25 ng/mL    Oxazepam 216 (H) <25 ng/mL    Lorazepam <25 <25 ng/mL    Methadone <25 <25 ng/mL    EDDP <25 <25 ng/mL    6-Acetylmorphine <25 <25 ng/mL    Codeine <50 <50 ng/mL    Hydrocodone <25 <25 ng/mL    Hydromorphone <25 <25 ng/mL    Morphine  <50 <50 ng/mL    Norhydrocodone <25 <25 ng/mL    Noroxycodone <25 <25 ng/mL    Oxycodone <25 <25 ng/mL    Oxymorphone <25 <25 ng/mL    Fentanyl <2.5 <2.5 ng/mL    Norfentanyl <2.5 <2.5 ng/mL    Tramadol <50 <50 ng/mL    O-Desmethyltramadol <50 <50 ng/mL    Zolpidem <25 <25 ng/mL    Zolpidem Metabolite (ZCA) <25 <25 ng/mL   Screen Opiate/Opioid/Benzo Prescription Compliance    Collection Time: 10/02/24  4:01 PM   Result Value Ref Range    Creatinine, Urine Random 265.0 20.0 - 320.0 mg/dL    Amphetamine Screen, Urine Presumptive Negative Presumptive Negative    Barbiturate Screen, Urine Presumptive Negative Presumptive Negative    Cannabinoid Screen, Urine Presumptive Negative Presumptive Negative    Cocaine Metabolite Screen, Urine Presumptive  Negative Presumptive Negative    PCP Screen, Urine Presumptive Negative Presumptive Negative   Tapentadol Confirmation, Urine    Collection Time: 10/02/24  4:01 PM   Result Value Ref Range    Tapentadol <25 <25 ng/mL    N-Desmethyltapentadol <25 <25 ng/mL   Buprenorphine Confirm,Urine    Collection Time: 10/02/24  4:01 PM   Result Value Ref Range    BUPRENORPHINE GLUC, URINE <5 ng/mL    BUPRENORPHINE ,URINE <2 ng/mL    NALOXONE, URINE <100 ng/mL    NORBUPRENORPHINE GLUC,URINE <5 ng/mL    NORBUPRENORPHINE, URINE <2 ng/mL     Results are as expected.     Controlled Substance Agreement:  Date of the Last Agreement: 10/2/24  Reviewed Controlled Substance Agreement including but not limited to the benefits, risks, and alternatives to treatment with a Controlled Substance medication(s).      Review of Systems      ROS:   General: No fevers, chills, weight loss  Skin: Negative for lesions  Eyes: No acute vision changes  Ears: No vertigo  Nose, mouth, throat: No difficulty swallowing or speaking  Respiratory: No cough, shortness of breath, cyanosis  Cardiovascular: Negative for chest pain syncope or palpitation  Gastrointestinal: No constipation, nausea, vomiting  Neurological: Negative for headache, positive for: Paresthesia and weakness  Psychological: Negative for severe or debilitating anxiety, depression. Negative memory loss  Musculoskeletal: Positive for arthralgia, myalgia, pain and spasms  Endocrine: Negative for weight gain, appetite changes, excessive sweating  Allergy/immune: Negative    All 13 systems were reviewed and are within normal levels except as noted or in the history of present illness.  Positive or pertinent negative responses are noted or were in the history of present illness. As noted, the patient denies significant or impairing weakness in the bilateral upper and lower extremities, medication induced constipation, and bowel or bladder incontinence.     Current Outpatient Medications   Medication  Instructions    albuterol 90 mcg/actuation inhaler 1-2 puffs, Every 6 hours PRN    atorvastatin (LIPITOR) 10 mg, Daily    blood sugar diagnostic (Accu-Chek Katie Plus test strp) strip 1 strip, Daily    cholecalciferol (VITAMIN D-3) 10,000 Units, Once Weekly    cream base no.31, bulk, (Transdermal Pain Base) cream Daily PRN    cyanocobalamin (VITAMIN B-12) 500 mcg, Daily    diazePAM (Valium) 5 mg tablet 1 tablet, Daily PRN    dicyclomine (BENTYL) 20 mg    DULoxetine (CYMBALTA) 60 mg, oral, Daily, Duloxetine 60 mg and 30 mg for total of 90 mg daily.    DULoxetine (CYMBALTA) 30 mg, oral, Daily, Do not crush or chew.  Patient takes duloxetine 30 mg along with the previous prescription of duloxetine 60 mg for total of 90 mg/day.    elderberry fruit and flower 460-115 mg capsule Take by mouth.    fluticasone (Flonase) 50 mcg/actuation nasal spray 2 sprays, Daily    HYDROcodone-acetaminophen (Norco) 5-325 mg tablet Take 1 tablet by mouth once daily. Do not start before October 17, 2023.    ibuprofen 800 mg, oral, Every 8 hours PRN, Take with food    multivitamin tablet Take by mouth.    naloxone (NARCAN) 4 mg, nasal, As needed    ondansetron (ZOFRAN) 4 mg, oral, 2 times daily PRN    Ozempic 1 mg, subcutaneous, Once Weekly    pantoprazole (PROTONIX) 40 mg, oral, Daily before breakfast, Do not crush, chew or split    pregabalin (LYRICA) 150 mg, oral, Daily    propranolol LA (INDERAL LA) 60 mg, Daily    scopolamine (Transderm-Scop) 1 mg over 3 days patch 3 day 1 patch, Every 72 hours    semaglutide (Ozempic) 1 mg/dose (4 mg/3 mL) pen injector Inject 1 mg under the skin Once a Week    SUMAtriptan (Imitrex) 6 mg/0.5 mL 1 Dose, Daily PRN    TRUEplus Lancets 33 gauge misc AS DIRECTED TWICE A DAY 90 DAYS        Past Medical History:   Diagnosis Date    Calculus of kidney 04/29/2019    Calcium kidney stone    Dependence on other enabling machines and devices     BiPAP (biphasic positive airway pressure) dependence    Dependence on  other enabling machines and devices     CPAP (continuous positive airway pressure) dependence    Generalized abdominal pain     Generalized abdominal pain    Irritable bowel syndrome without diarrhea 09/18/2019    Irritable bowel syndrome, unspecified type    Local infection of the skin and subcutaneous tissue, unspecified 08/04/2020    Infection of right foot    Low back pain, unspecified 04/05/2021    Lumbar pain    Other conditions influencing health status     Uses inhaler device    Other specified disorders of adrenal gland     Adrenal mass    Pain in right finger(s) 07/10/2017    Pain of right thumb    Personal history of other diseases of the digestive system     History of esophageal reflux    Personal history of other diseases of the female genital tract     History of endometriosis    Personal history of other diseases of the musculoskeletal system and connective tissue 07/13/2017    History of lateral epicondylitis of right elbow    Personal history of other diseases of the musculoskeletal system and connective tissue     History of degenerative disc disease    Personal history of other diseases of the musculoskeletal system and connective tissue     History of chronic back pain    Personal history of other diseases of the musculoskeletal system and connective tissue     History of fibromyalgia    Personal history of other diseases of the musculoskeletal system and connective tissue     History of arthritis    Personal history of other diseases of the nervous system and sense organs 09/18/2019    History of Meniere's disease    Personal history of other diseases of the nervous system and sense organs     History of ear pain    Personal history of other diseases of the nervous system and sense organs     History of sleep apnea    Personal history of other diseases of the nervous system and sense organs     History of migraine    Personal history of other diseases of urinary system     History of hematuria     Personal history of other malignant neoplasm of skin     History of malignant neoplasm of skin    Personal history of other mental and behavioral disorders     History of depression    Personal history of other specified conditions     History of dizziness    Personal history of urinary calculi     H/O renal calculi    Radiculopathy, lumbar region 10/05/2020    Bilateral lumbar radiculopathy    Shortness of breath     SOB (shortness of breath) on exertion        Past Surgical History:   Procedure Laterality Date    OTHER SURGICAL HISTORY  02/26/2021    Cyst excision    OTHER SURGICAL HISTORY  09/18/2019    Oophorectomy    OTHER SURGICAL HISTORY  09/18/2019    Carpal tunnel surgery    OTHER SURGICAL HISTORY  09/18/2019    Foot surgery    OTHER SURGICAL HISTORY  09/18/2019    Excision of basal cell carcinoma    OTHER SURGICAL HISTORY  02/05/2019    Cholecystectomy    OTHER SURGICAL HISTORY  02/05/2019    Hysterectomy    OTHER SURGICAL HISTORY  02/05/2019    Tonsillectomy    OTHER SURGICAL HISTORY  02/05/2019    Skin biopsy    OTHER SURGICAL HISTORY  09/18/2019    Wrist surgery    OTHER SURGICAL HISTORY  09/18/2019    Breast biopsy        Family History   Problem Relation Name Age of Onset    Drug abuse Mother      Stroke Mother      Coronary artery disease Mother      Diabetes Mother      Cancer Mother      Breast cancer Mother      Cancer Father      Skin cancer Father      Anxiety disorder Father      Depression Father      Cancer Sister      Skin cancer Sister      Cancer Brother      Esophageal cancer Brother      Skin cancer Brother          Allergies   Allergen Reactions    Cefdinir Nausea Only and Other     Stomach pain    Metformin Diarrhea and Nausea Only    Nitrofurantoin Unknown     nausea    Nortriptyline Unknown     Dizziness    Sulfamethoxazole-Trimethoprim Unknown    Sulfur Unknown    Buprenorphine Rash     patch irritates skin        MR cervical spine wo IV contrast 08/17/2023    Narrative  Patient  Name: MARSHALL KNOX  : 1955  MRN: [id number]  [id number]  Accession: [id number]  Exam Date/Time: 2023 12:03  Procedure: MR CERVICAL SPINE WO CONTRAST  Ordering Provider: HARRIS RAJIV  Reason For Exam: M47.22      MRI CERVICAL SPINE:    INDICATION: Neck pain, cervical radiculopathy.    COMPARISON: No previous studies are available for comparison.    MRI of the cervical spine was performed utilizing T1 and T2 weighted sagittal images as well has proton density axial images.    The cervical vertebrae are well aligned in the sagittal plane.  The vertebral body heights and disc spaces are well-preserved. The signal intensity of the vertebral bodies is normal. The signal intensity of the cord is normal.  The cervicomedullary junction is normal.    Axial images obtained at the C2-3 level demonstrate no evidence of disc protrusion, annular bulge, neuroforaminal compromise or central canal stenosis.  There is no evidence of focal spinal cord signal abnormality at this level.     Axial images obtained at the C3-4 level demonstrate no evidence of disc protrusion, annular bulge, neuroforaminal compromise or central canal stenosis.  There is no evidence of focal spinal cord signal abnormality at this level.    Axial images obtained at the C4-5 level demonstrate no evidence of disc protrusion or annular bulge.  There is mild bilateral foraminal encroachment.    Axial images obtained at the C5-6 level demonstrate no evidence of disc protrusion, annular bulge, neuroforaminal compromise or central canal stenosis.  There is no evidence of focal spinal cord signal abnormality at this level.    Axial images obtained at the C6-7 level demonstrate no evidence of disc protrusion, annular bulge, neuroforaminal compromise or central canal stenosis.  There is no evidence of focal spinal cord signal abnormality at this level.    Axial images obtained at the C7-T1 level demonstrate no evidence of disc protrusion,  annular bulge, neuroforaminal compromise or central canal stenosis.  There is no evidence of focal spinal cord signal abnormality at this level.    Impression  Facet osteoarthritis is present at C4-C5 with mild bilateral foraminal encroachment.  No central canal stenosis.    Report Dictated on Workstation: [id number]  Electronically Signed By: Clem Alexander DO  Electronically Signed Date/Time: 8/20/2023 6:24 PM EDT      Objective     Vitals:    01/02/25 1137   BP: 98/66   Pulse: 77   Resp: 16   SpO2: 97%               Physical Exam    GENERAL EXAM  Vital Signs: Vital signs to include heart rate, respiration rate, blood pressure, and temperature were reviewed.  General Appearance:  Awake, alert, healthy appearing, well developed, No acute distress.  Head: Normocephalic without evidence of head injury.  Neck: The appearance of the neck was normal without swelling with a midline trachea.  Eyes: The eyelids and eyebrows exhibited no abnormalities.  Pupils were not pin-point.  Sclera was without icterus.  Lungs: Respiration rhythm and depth was normal.  Respiratory movements were normal without labored breathing.  Cardiovascular: No peripheral edema was present.    Spine, cervical: Tenderness to paraspinous musculature paracervical region bilaterally.  Flexion intact with extension more limited and increasing pain.  Rotational twisting increasing pain.  Spine, lumbar: The patient is able to rise from seated to standing position without hesitancy, push off or delay.  Gait remains slow, deliberate and forward leaning.  Tenderness to paraspinous musculature lower lumbar region bilaterally.  Flexion intact with extension more limited and increasing pain.  Ortho: hip: Pain/tenderness over bilateral greater trochanteric bursa.  Pain with active/passive range of motion bilateral hips.  Positive Lizy bilaterally.  Neurological: Patient was oriented to time, place, and person.  Speech was normal.  Balance, gait, and stance  were unremarkable.    Psychiatric: Appearance was normal with appropriate dress.  Mood was euthymic and affect was normal.  Skin: Affected regions were without ecchymosis or skin lesions.          Assessment/Plan   Problem List Items Addressed This Visit             ICD-10-CM    Cervical radiculopathy, chronic M54.12    Relevant Medications    DULoxetine (Cymbalta) 60 mg DR capsule    DULoxetine (Cymbalta) 30 mg DR capsule    pregabalin (Lyrica) 75 mg capsule    ibuprofen 800 mg tablet    Chronic lumbar radiculopathy M54.16     69-year-old female who presents for follow-up for cervical radicular symptoms, lumbar radicular symptoms and polyarticular pain.  Patient has done well with previous injections targeting lumbar radicular symptoms.  Not interested in repeating these injections at this time.  Most recently patient was sent for cervical epidural steroid injection targeting cervical radicular symptoms which unfortunately did not provide relief.  Patient also has polyarticular pain most bothersome to shoulders, knees and hips.  Patient did well with previous hip injections.  Evaluated by Ortho/spine surgeon for cervical pain and per patient not offered a surgical intervention at this time.  She would like to continue medication management for current symptoms.  She may be interested in bilateral hip injections and plans to follow-up with orthopedics at the [facility] or advised she may contact our office if she would like to proceed with hip injections.  Continues home therapy stretches and exercises as able.  Continues to be bothered by neuropathic pain bilateral hands and feet.  She does feel that the transition to Lyrica has been beneficial and is providing additional neuropathic pain relief.  She could not tolerate 75 mg twice daily due to daytime drowsiness and continues to do well with the 150 mg at bedtime.  She also feels that the increase of duloxetine to 90 mg daily has provided additional  neuropathic relief. Continues to occasionally supplement with ibuprofen.  She has noted improvement in pain relief with the change in her NSAID compounding cream.  She also continues to benefit from Valium which treats her symptoms of vertigo.  She states that she uses her Valium sparingly only when her vertigo is most intense.  Continued benefit with her TENS unit.  Previously discussed restarting physical therapy.  She would like to continue her home therapy exercises and stretches at this time. Plan discussed with patient at today's visit.      -Continue Lyrica 150 mg at bedtime.  Tolerating without adverse effects.  -Continue duloxetine to 90 mg daily.     -Continue occasional ibuprofen 800 mg as needed.  Patient states she is taking sparingly.  The patient was cautioned about possible side effects and risks of this medication including stomach upset, stomach ulcers, kidney risks and cardiovascular risks to include hypertension and slightly increased risks of stroke and myocardial infarction. Also discussed were ways to minimize these risks by taking this medication with food, staying well-hydrated, watching for any hypertension, and taking the medication with a stomach protectant such as pepcid, zantac, or a proton pump inhibitor. Most recent CMP consistent with adequate renal function.  -Continue NSAID compounding cream specifically targeting foot pain/neuropathic pain.    -Continue using Nex wave electrotherapy unit  -Patient may benefit from further injections targeting cervical radicular symptoms as well as lumbar radicular symptoms in the future.  She may consider injections if symptoms should worsen.  -Patient plans to follow-up with orthopedics to pursue hip injections.  Patient has more optimal insurance coverage if injections are done within an office setting.  -Encourage patient to continue home therapy exercises and stretches targeting cervical, low back pain as well as polyarticular pain.  -Advised  patient to follow up with her PCP if muscle cramping   -Patient will follow-up in 6 months or sooner if needed.        Disclaimer: This note was transcribed using an audio transcription device.  As such, minor errors may be present with regard to spelling, punctuation, and inadvertent word insertion.  Please disregard such errors.           Relevant Medications    DULoxetine (Cymbalta) 60 mg DR capsule    DULoxetine (Cymbalta) 30 mg DR capsule    pregabalin (Lyrica) 75 mg capsule    ibuprofen 800 mg tablet    Chronic neck and back pain - Primary M54.2, M54.9, G89.29    Greater trochanteric bursitis of both hips M70.61, M70.62    Osteoarthritis M19.90    Relevant Medications    DULoxetine (Cymbalta) 60 mg DR capsule    ibuprofen 800 mg tablet     Other Visit Diagnoses         Codes    Neuropathy     G62.9    Relevant Medications    DULoxetine (Cymbalta) 30 mg DR capsule    pregabalin (Lyrica) 75 mg capsule                   This note was generated with the aid of dictation software, there may be typos despite my attempts at proofreading.

## 2025-01-02 NOTE — ASSESSMENT & PLAN NOTE
69-year-old female who presents for follow-up for cervical radicular symptoms, lumbar radicular symptoms and polyarticular pain.  Patient has done well with previous injections targeting lumbar radicular symptoms.  Not interested in repeating these injections at this time.  Most recently patient was sent for cervical epidural steroid injection targeting cervical radicular symptoms which unfortunately did not provide relief.  Patient also has polyarticular pain most bothersome to shoulders, knees and hips.  Patient did well with previous hip injections.  Evaluated by Ortho/spine surgeon for cervical pain and per patient not offered a surgical intervention at this time.  She would like to continue medication management for current symptoms.  She may be interested in bilateral hip injections and plans to follow-up with orthopedics at the [facility] or advised she may contact our office if she would like to proceed with hip injections.  Continues home therapy stretches and exercises as able.  Continues to be bothered by neuropathic pain bilateral hands and feet.  She does feel that the transition to Lyrica has been beneficial and is providing additional neuropathic pain relief.  She could not tolerate 75 mg twice daily due to daytime drowsiness and continues to do well with the 150 mg at bedtime.  She also feels that the increase of duloxetine to 90 mg daily has provided additional neuropathic relief. Continues to occasionally supplement with ibuprofen.  She has noted improvement in pain relief with the change in her NSAID compounding cream.  She also continues to benefit from Valium which treats her symptoms of vertigo.  She states that she uses her Valium sparingly only when her vertigo is most intense.  Continued benefit with her TENS unit.  Previously discussed restarting physical therapy.  She would like to continue her home therapy exercises and stretches at this time. Plan discussed with patient at today's  visit.      -Continue Lyrica 150 mg at bedtime.  Tolerating without adverse effects.  -Continue duloxetine to 90 mg daily.     -Continue occasional ibuprofen 800 mg as needed.  Patient states she is taking sparingly.  The patient was cautioned about possible side effects and risks of this medication including stomach upset, stomach ulcers, kidney risks and cardiovascular risks to include hypertension and slightly increased risks of stroke and myocardial infarction. Also discussed were ways to minimize these risks by taking this medication with food, staying well-hydrated, watching for any hypertension, and taking the medication with a stomach protectant such as pepcid, zantac, or a proton pump inhibitor. Most recent CMP consistent with adequate renal function.  -Continue NSAID compounding cream specifically targeting foot pain/neuropathic pain.    -Continue using Nex wave electrotherapy unit  -Patient may benefit from further injections targeting cervical radicular symptoms as well as lumbar radicular symptoms in the future.  She may consider injections if symptoms should worsen.  -Patient plans to follow-up with orthopedics to pursue hip injections.  Patient has more optimal insurance coverage if injections are done within an office setting.  -Encourage patient to continue home therapy exercises and stretches targeting cervical, low back pain as well as polyarticular pain.  -Advised patient to follow up with her PCP if muscle cramping   -Patient will follow-up in 6 months or sooner if needed.        Disclaimer: This note was transcribed using an audio transcription device.  As such, minor errors may be present with regard to spelling, punctuation, and inadvertent word insertion.  Please disregard such errors.

## 2025-01-05 PROCEDURE — RXMED WILLOW AMBULATORY MEDICATION CHARGE

## 2025-01-07 ENCOUNTER — PHARMACY VISIT (OUTPATIENT)
Dept: PHARMACY | Facility: CLINIC | Age: 70
End: 2025-01-07
Payer: COMMERCIAL

## 2025-01-30 PROCEDURE — RXMED WILLOW AMBULATORY MEDICATION CHARGE

## 2025-01-31 ENCOUNTER — PHARMACY VISIT (OUTPATIENT)
Dept: PHARMACY | Facility: CLINIC | Age: 70
End: 2025-01-31
Payer: COMMERCIAL

## 2025-02-12 DIAGNOSIS — M54.12 CERVICAL RADICULOPATHY, CHRONIC: ICD-10-CM

## 2025-02-12 DIAGNOSIS — M19.90 OSTEOARTHRITIS, UNSPECIFIED OSTEOARTHRITIS TYPE, UNSPECIFIED SITE: ICD-10-CM

## 2025-02-12 DIAGNOSIS — M54.16 CHRONIC LUMBAR RADICULOPATHY: ICD-10-CM

## 2025-02-13 RX ORDER — DULOXETIN HYDROCHLORIDE 60 MG/1
60 CAPSULE, DELAYED RELEASE ORAL DAILY
Qty: 90 CAPSULE | Refills: 1 | OUTPATIENT
Start: 2025-02-13

## 2025-03-03 ENCOUNTER — PHARMACY VISIT (OUTPATIENT)
Dept: PHARMACY | Facility: CLINIC | Age: 70
End: 2025-03-03
Payer: COMMERCIAL

## 2025-03-03 PROCEDURE — RXMED WILLOW AMBULATORY MEDICATION CHARGE

## 2025-03-03 RX ORDER — SEMAGLUTIDE 1.34 MG/ML
INJECTION, SOLUTION SUBCUTANEOUS
Qty: 9 ML | Refills: 1 | OUTPATIENT
Start: 2025-03-03

## 2025-05-22 ENCOUNTER — PHARMACY VISIT (OUTPATIENT)
Dept: PHARMACY | Facility: CLINIC | Age: 70
End: 2025-05-22
Payer: COMMERCIAL

## 2025-05-22 PROCEDURE — RXMED WILLOW AMBULATORY MEDICATION CHARGE

## 2025-06-30 ENCOUNTER — APPOINTMENT (OUTPATIENT)
Dept: PAIN MEDICINE | Facility: HOSPITAL | Age: 70
End: 2025-06-30
Payer: MEDICARE